# Patient Record
Sex: FEMALE | Race: WHITE | Employment: UNEMPLOYED | URBAN - NONMETROPOLITAN AREA
[De-identification: names, ages, dates, MRNs, and addresses within clinical notes are randomized per-mention and may not be internally consistent; named-entity substitution may affect disease eponyms.]

---

## 2017-01-03 ENCOUNTER — OFFICE VISIT (OUTPATIENT)
Dept: FAMILY MEDICINE CLINIC | Age: 42
End: 2017-01-03

## 2017-01-03 VITALS
DIASTOLIC BLOOD PRESSURE: 74 MMHG | HEIGHT: 60 IN | HEART RATE: 92 BPM | TEMPERATURE: 99.1 F | BODY MASS INDEX: 34.59 KG/M2 | OXYGEN SATURATION: 98 % | WEIGHT: 176.2 LBS | SYSTOLIC BLOOD PRESSURE: 114 MMHG

## 2017-01-03 DIAGNOSIS — J02.9 SORE THROAT: Primary | ICD-10-CM

## 2017-01-03 LAB
INFLUENZA A ANTIBODY: NORMAL
INFLUENZA B ANTIBODY: NORMAL
S PYO AG THROAT QL: NORMAL

## 2017-01-03 PROCEDURE — 87880 STREP A ASSAY W/OPTIC: CPT | Performed by: PHYSICIAN ASSISTANT

## 2017-01-03 PROCEDURE — 99213 OFFICE O/P EST LOW 20 MIN: CPT | Performed by: PHYSICIAN ASSISTANT

## 2017-01-03 PROCEDURE — 87804 INFLUENZA ASSAY W/OPTIC: CPT | Performed by: PHYSICIAN ASSISTANT

## 2017-01-03 RX ORDER — AMOXICILLIN 500 MG/1
500 CAPSULE ORAL 3 TIMES DAILY
Qty: 30 CAPSULE | Refills: 0 | Status: SHIPPED | OUTPATIENT
Start: 2017-01-03 | End: 2017-01-03 | Stop reason: SDUPTHER

## 2017-01-03 RX ORDER — AMOXICILLIN 500 MG/1
500 CAPSULE ORAL 3 TIMES DAILY
Qty: 30 CAPSULE | Refills: 0 | Status: SHIPPED | OUTPATIENT
Start: 2017-01-03 | End: 2017-01-13

## 2017-01-03 ASSESSMENT — ENCOUNTER SYMPTOMS
SORE THROAT: 1
COUGH: 1
SWOLLEN GLANDS: 1

## 2017-06-05 ENCOUNTER — TELEPHONE (OUTPATIENT)
Dept: FAMILY MEDICINE CLINIC | Age: 42
End: 2017-06-05

## 2017-06-07 ENCOUNTER — OFFICE VISIT (OUTPATIENT)
Dept: FAMILY MEDICINE CLINIC | Age: 42
End: 2017-06-07
Payer: COMMERCIAL

## 2017-06-07 VITALS
BODY MASS INDEX: 34.55 KG/M2 | DIASTOLIC BLOOD PRESSURE: 70 MMHG | SYSTOLIC BLOOD PRESSURE: 102 MMHG | HEART RATE: 72 BPM | WEIGHT: 176 LBS | HEIGHT: 60 IN

## 2017-06-07 DIAGNOSIS — E11.9 TYPE 2 DIABETES MELLITUS WITHOUT COMPLICATION, WITHOUT LONG-TERM CURRENT USE OF INSULIN (HCC): Primary | ICD-10-CM

## 2017-06-07 DIAGNOSIS — I10 ESSENTIAL HYPERTENSION: ICD-10-CM

## 2017-06-07 DIAGNOSIS — E78.5 HYPERLIPIDEMIA, UNSPECIFIED HYPERLIPIDEMIA TYPE: ICD-10-CM

## 2017-06-07 PROCEDURE — 3045F PR MOST RECENT HEMOGLOBIN A1C LEVEL 7.0-9.0%: CPT | Performed by: PHYSICIAN ASSISTANT

## 2017-06-07 PROCEDURE — 99214 OFFICE O/P EST MOD 30 MIN: CPT | Performed by: PHYSICIAN ASSISTANT

## 2017-06-07 PROCEDURE — G8417 CALC BMI ABV UP PARAM F/U: HCPCS | Performed by: PHYSICIAN ASSISTANT

## 2017-06-07 PROCEDURE — G8427 DOCREV CUR MEDS BY ELIG CLIN: HCPCS | Performed by: PHYSICIAN ASSISTANT

## 2017-06-07 PROCEDURE — 1036F TOBACCO NON-USER: CPT | Performed by: PHYSICIAN ASSISTANT

## 2017-06-07 RX ORDER — ROSUVASTATIN CALCIUM 5 MG/1
5 TABLET, COATED ORAL DAILY
Qty: 30 TABLET | Refills: 6 | Status: SHIPPED | OUTPATIENT
Start: 2017-06-07 | End: 2017-12-14 | Stop reason: SDUPTHER

## 2017-06-07 RX ORDER — LISINOPRIL 5 MG/1
5 TABLET ORAL DAILY
Qty: 30 TABLET | Refills: 6 | Status: SHIPPED | OUTPATIENT
Start: 2017-06-07 | End: 2017-12-14 | Stop reason: SDUPTHER

## 2017-06-07 ASSESSMENT — ENCOUNTER SYMPTOMS: RESPIRATORY NEGATIVE: 1

## 2017-07-28 ENCOUNTER — TELEPHONE (OUTPATIENT)
Dept: GENERAL RADIOLOGY | Age: 42
End: 2017-07-28

## 2017-07-28 DIAGNOSIS — Z12.31 ENCOUNTER FOR SCREENING MAMMOGRAM FOR BREAST CANCER: Primary | ICD-10-CM

## 2017-08-03 ENCOUNTER — OFFICE VISIT (OUTPATIENT)
Dept: OBGYN | Age: 42
End: 2017-08-03
Payer: COMMERCIAL

## 2017-08-03 ENCOUNTER — HOSPITAL ENCOUNTER (OUTPATIENT)
Age: 42
Setting detail: SPECIMEN
Discharge: HOME | End: 2017-08-03
Payer: COMMERCIAL

## 2017-08-03 VITALS
SYSTOLIC BLOOD PRESSURE: 116 MMHG | DIASTOLIC BLOOD PRESSURE: 84 MMHG | WEIGHT: 175.4 LBS | HEIGHT: 60 IN | BODY MASS INDEX: 34.44 KG/M2 | HEART RATE: 68 BPM

## 2017-08-03 DIAGNOSIS — R92.8 FOLLOW-UP EXAMINATION OF ABNORMAL MAMMOGRAM: Primary | ICD-10-CM

## 2017-08-03 DIAGNOSIS — Z12.4 CERVICAL CANCER SCREENING: ICD-10-CM

## 2017-08-03 DIAGNOSIS — Z12.31 ENCOUNTER FOR SCREENING MAMMOGRAM FOR BREAST CANCER: ICD-10-CM

## 2017-08-03 DIAGNOSIS — Z01.419 WELL FEMALE EXAM WITH ROUTINE GYNECOLOGICAL EXAM: Primary | ICD-10-CM

## 2017-08-03 PROCEDURE — 99396 PREV VISIT EST AGE 40-64: CPT | Performed by: NURSE PRACTITIONER

## 2017-08-04 LAB — CYTOLOGY REPORT: NORMAL

## 2017-08-06 DIAGNOSIS — I10 ESSENTIAL HYPERTENSION: ICD-10-CM

## 2017-08-06 DIAGNOSIS — E78.5 HYPERLIPIDEMIA, UNSPECIFIED HYPERLIPIDEMIA TYPE: ICD-10-CM

## 2017-08-07 RX ORDER — ROSUVASTATIN CALCIUM 5 MG/1
TABLET, COATED ORAL
Qty: 30 TABLET | Refills: 0 | OUTPATIENT
Start: 2017-08-07

## 2017-08-07 RX ORDER — LISINOPRIL 5 MG/1
TABLET ORAL
Qty: 30 TABLET | Refills: 0 | OUTPATIENT
Start: 2017-08-07

## 2017-08-10 DIAGNOSIS — R92.8 FOLLOW-UP EXAMINATION OF ABNORMAL MAMMOGRAM: Primary | ICD-10-CM

## 2017-12-09 ENCOUNTER — HOSPITAL ENCOUNTER (OUTPATIENT)
Dept: LAB | Age: 42
Setting detail: SPECIMEN
Discharge: HOME | End: 2017-12-09
Payer: COMMERCIAL

## 2017-12-09 DIAGNOSIS — E11.9 TYPE 2 DIABETES MELLITUS WITHOUT COMPLICATION, WITHOUT LONG-TERM CURRENT USE OF INSULIN (HCC): ICD-10-CM

## 2017-12-09 DIAGNOSIS — E78.5 HYPERLIPIDEMIA, UNSPECIFIED HYPERLIPIDEMIA TYPE: ICD-10-CM

## 2017-12-09 LAB
ALBUMIN SERPL-MCNC: 4.3 G/DL (ref 3.5–5.2)
ALBUMIN/GLOBULIN RATIO: 1.5 (ref 1–2.5)
ALP BLD-CCNC: 58 U/L (ref 35–104)
ALT SERPL-CCNC: 48 U/L (ref 5–33)
ANION GAP SERPL CALCULATED.3IONS-SCNC: 13 MMOL/L (ref 9–17)
AST SERPL-CCNC: 43 U/L
BILIRUB SERPL-MCNC: 0.98 MG/DL (ref 0.3–1.2)
BUN BLDV-MCNC: 14 MG/DL (ref 6–20)
BUN/CREAT BLD: 29 (ref 9–20)
CALCIUM SERPL-MCNC: 9 MG/DL (ref 8.6–10.4)
CHLORIDE BLD-SCNC: 101 MMOL/L (ref 98–107)
CHOLESTEROL/HDL RATIO: 2.4
CHOLESTEROL: 140 MG/DL
CO2: 25 MMOL/L (ref 20–31)
CREAT SERPL-MCNC: 0.49 MG/DL (ref 0.5–0.9)
ESTIMATED AVERAGE GLUCOSE: 171 MG/DL
GFR AFRICAN AMERICAN: >60 ML/MIN
GFR NON-AFRICAN AMERICAN: >60 ML/MIN
GFR SERPL CREATININE-BSD FRML MDRD: ABNORMAL ML/MIN/{1.73_M2}
GFR SERPL CREATININE-BSD FRML MDRD: ABNORMAL ML/MIN/{1.73_M2}
GLUCOSE BLD-MCNC: 151 MG/DL (ref 70–99)
HBA1C MFR BLD: 7.6 % (ref 4.8–5.9)
HDLC SERPL-MCNC: 58 MG/DL
LDL CHOLESTEROL: 62 MG/DL (ref 0–130)
POTASSIUM SERPL-SCNC: 4.1 MMOL/L (ref 3.7–5.3)
SODIUM BLD-SCNC: 139 MMOL/L (ref 135–144)
TOTAL PROTEIN: 7.1 G/DL (ref 6.4–8.3)
TRIGL SERPL-MCNC: 102 MG/DL
VLDLC SERPL CALC-MCNC: NORMAL MG/DL (ref 1–30)

## 2017-12-09 PROCEDURE — 83036 HEMOGLOBIN GLYCOSYLATED A1C: CPT

## 2017-12-09 PROCEDURE — 80061 LIPID PANEL: CPT

## 2017-12-09 PROCEDURE — 80053 COMPREHEN METABOLIC PANEL: CPT

## 2017-12-09 PROCEDURE — 36415 COLL VENOUS BLD VENIPUNCTURE: CPT

## 2017-12-14 ENCOUNTER — OFFICE VISIT (OUTPATIENT)
Dept: FAMILY MEDICINE CLINIC | Age: 42
End: 2017-12-14
Payer: COMMERCIAL

## 2017-12-14 VITALS
HEART RATE: 88 BPM | WEIGHT: 179 LBS | DIASTOLIC BLOOD PRESSURE: 70 MMHG | HEIGHT: 60 IN | BODY MASS INDEX: 35.14 KG/M2 | SYSTOLIC BLOOD PRESSURE: 100 MMHG

## 2017-12-14 DIAGNOSIS — E78.5 HYPERLIPIDEMIA, UNSPECIFIED HYPERLIPIDEMIA TYPE: ICD-10-CM

## 2017-12-14 DIAGNOSIS — I10 ESSENTIAL HYPERTENSION: ICD-10-CM

## 2017-12-14 DIAGNOSIS — E11.9 TYPE 2 DIABETES MELLITUS WITHOUT COMPLICATION, WITHOUT LONG-TERM CURRENT USE OF INSULIN (HCC): Primary | ICD-10-CM

## 2017-12-14 PROCEDURE — G8427 DOCREV CUR MEDS BY ELIG CLIN: HCPCS | Performed by: PHYSICIAN ASSISTANT

## 2017-12-14 PROCEDURE — G8417 CALC BMI ABV UP PARAM F/U: HCPCS | Performed by: PHYSICIAN ASSISTANT

## 2017-12-14 PROCEDURE — G8484 FLU IMMUNIZE NO ADMIN: HCPCS | Performed by: PHYSICIAN ASSISTANT

## 2017-12-14 PROCEDURE — 3045F PR MOST RECENT HEMOGLOBIN A1C LEVEL 7.0-9.0%: CPT | Performed by: PHYSICIAN ASSISTANT

## 2017-12-14 PROCEDURE — 1036F TOBACCO NON-USER: CPT | Performed by: PHYSICIAN ASSISTANT

## 2017-12-14 PROCEDURE — 99214 OFFICE O/P EST MOD 30 MIN: CPT | Performed by: PHYSICIAN ASSISTANT

## 2017-12-14 RX ORDER — IBUPROFEN 800 MG/1
800 TABLET ORAL EVERY 6 HOURS PRN
Qty: 100 TABLET | Refills: 3 | Status: SHIPPED | OUTPATIENT
Start: 2017-12-14 | End: 2019-10-08

## 2017-12-14 RX ORDER — LISINOPRIL 5 MG/1
5 TABLET ORAL DAILY
Qty: 30 TABLET | Refills: 6 | Status: SHIPPED | OUTPATIENT
Start: 2017-12-14 | End: 2018-08-15

## 2017-12-14 RX ORDER — ROSUVASTATIN CALCIUM 5 MG/1
5 TABLET, COATED ORAL DAILY
Qty: 30 TABLET | Refills: 6 | Status: SHIPPED | OUTPATIENT
Start: 2017-12-14 | End: 2018-08-15

## 2017-12-14 ASSESSMENT — PATIENT HEALTH QUESTIONNAIRE - PHQ9
2. FEELING DOWN, DEPRESSED OR HOPELESS: 0
SUM OF ALL RESPONSES TO PHQ9 QUESTIONS 1 & 2: 0
SUM OF ALL RESPONSES TO PHQ QUESTIONS 1-9: 0
1. LITTLE INTEREST OR PLEASURE IN DOING THINGS: 0

## 2017-12-14 ASSESSMENT — ENCOUNTER SYMPTOMS: RESPIRATORY NEGATIVE: 1

## 2017-12-14 NOTE — PROGRESS NOTES
Chronic Disease Visit Information    BP Readings from Last 3 Encounters:   08/03/17 116/84   06/07/17 102/70   01/03/17 114/74          Hemoglobin A1C (%)   Date Value   12/09/2017 7.6 (H)   06/03/2017 7.2 (H)   10/01/2016 7.0 (H)     Microalb/Crt. Ratio (mcg/mg creat)   Date Value   10/01/2016 5     LDL Cholesterol (mg/dL)   Date Value   12/09/2017 62     HDL (mg/dL)   Date Value   12/09/2017 58     BUN (mg/dL)   Date Value   12/09/2017 14     CREATININE (mg/dL)   Date Value   12/09/2017 0.49 (L)     Glucose (mg/dL)   Date Value   12/09/2017 151 (H)            Have you changed or started any medications since your last visit including any over-the-counter medicines, vitamins, or herbal medicines? no   Are you having any side effects from any of your medications? -  no  Have you stopped taking any of your medications? Is so, why? -  no    Have you seen any other physician or provider since your last visit? No  Have you had any other diagnostic tests since your last visit? Yes - Records Obtained  Have you been seen in the emergency room and/or had an admission to a hospital since we last saw you? No  Have you had your annual diabetic retinal (eye) exam? Yes - Records Obtained  Have you had your routine dental cleaning in the past 6 months? no    Have you activated your [facility] account? If not, what are your barriers?  Yes      Patient Care Team:  Jeannette Beckman as PCP - General (Family Medicine)         Medical History Review  Past Medical, Family, and Social History reviewed and does contribute to the patient presenting condition    Health Maintenance   Topic Date Due    Diabetic retinal exam  08/31/1983    Diabetic microalbuminuria test  10/01/2017    HIV screen  01/14/2018 (Originally 8/31/1988)    Pneumococcal med risk (1 of 1 - PPSV23) 06/07/2018 (Originally 8/31/1992)    DTaP/Tdap/Td vaccine (1 - Tdap) 06/14/2018 (Originally 8/31/1992)    Flu vaccine (1) 06/14/2018 (Originally 9/1/2017)    Diabetic

## 2017-12-14 NOTE — PROGRESS NOTES
Social History   Substance Use Topics    Smoking status: Never Smoker    Smokeless tobacco: Never Used    Alcohol use No       Objective:   Physical Exam   Constitutional: She is oriented to person, place, and time. She appears well-developed. HENT:   Head: Normocephalic. Right Ear: External ear normal.   Left Ear: External ear normal.   Eyes: Pupils are equal, round, and reactive to light. Neck: Neck supple. Cardiovascular: Normal rate and regular rhythm. Pulmonary/Chest: Effort normal and breath sounds normal.   Abdominal: Soft. Bowel sounds are normal.   Neurological: She is alert and oriented to person, place, and time. Skin: Skin is warm and dry. Psychiatric: She has a normal mood and affect.        Hospital Outpatient Visit on 12/09/2017   Component Date Value Ref Range Status    Glucose 12/09/2017 151* 70 - 99 mg/dL Final    BUN 12/09/2017 14  6 - 20 mg/dL Final    CREATININE 12/09/2017 0.49* 0.50 - 0.90 mg/dL Final    Bun/Cre Ratio 12/09/2017 29* 9 - 20 Final    Calcium 12/09/2017 9.0  8.6 - 10.4 mg/dL Final    Sodium 12/09/2017 139  135 - 144 mmol/L Final    Potassium 12/09/2017 4.1  3.7 - 5.3 mmol/L Final    Chloride 12/09/2017 101  98 - 107 mmol/L Final    CO2 12/09/2017 25  20 - 31 mmol/L Final    Anion Gap 12/09/2017 13  9 - 17 mmol/L Final    Alkaline Phosphatase 12/09/2017 58  35 - 104 U/L Final    ALT 12/09/2017 48* 5 - 33 U/L Final    AST 12/09/2017 43* <32 U/L Final    Total Bilirubin 12/09/2017 0.98  0.3 - 1.2 mg/dL Final    Total Protein 12/09/2017 7.1  6.4 - 8.3 g/dL Final    Alb 12/09/2017 4.3  3.5 - 5.2 g/dL Final    Albumin/Globulin Ratio 12/09/2017 1.5  1.0 - 2.5 Final    GFR Non- 12/09/2017 >60  >60 mL/min Final    GFR  12/09/2017 >60  >60 mL/min Final    GFR Comment 12/09/2017        Final    Comment: Average GFR for 38-51 years old:   80 mL/min/1.73sq m  Chronic Kidney Disease:   <60 mL/min/1.73sq m  Kidney failure:   <15 mL/min/1.73sq m              eGFR calculated using average adult body mass. Additional eGFR calculator   available at:        [email or web address]        Performed at [facility] [address] [facility] [address] Gladys Ruiz [phone]      GFR Staging 12/09/2017 NOT REPORTED   Final    Cholesterol 12/09/2017 140  <200 mg/dL Final    Comment:    Cholesterol Guidelines:      <200  Desirable   200-240  Borderline      >240  Undesirable         HDL 12/09/2017 58  >40 mg/dL Final    Comment:    HDL Guidelines:    <40     Undesirable   40-59    Borderline    >59     Desirable         LDL Cholesterol 12/09/2017 62  0 - 130 mg/dL Final    Comment:    LDL Guidelines:     <100    Desirable   100-129   Near to/above Desirable   130-159   Borderline      >159   Undesirable     Direct (measured) LDL and calculated LDL are not interchangeable tests.  Chol/HDL Ratio 12/09/2017 2.4  <5 Final    Triglycerides 12/09/2017 102  <150 mg/dL Final    Comment:    Triglyceride Guidelines:     <150   Desirable   150-199  Borderline   200-499  High     >499   Very high   Based on AHA Guidelines for fasting triglyceride, October 2012. Performed at [facility] [address] [address] [phone]      VLDL 12/09/2017 NOT REPORTED  1 - 30 mg/dL Final    Hemoglobin A1C 12/09/2017 7.6* 4.8 - 5.9 % Final    Estimated Avg Glucose 12/09/2017 171  mg/dL Final    Comment: Performed at [facility] [address] [address] [phone]         Assessment:      1. Type 2 diabetes mellitus without complication, without long-term current use of insulin ([address]    2. Hyperlipidemia, unspecified hyperlipidemia type    3. Essential hypertension          Plan:      Laboratory studies reviewed with patient. Sugars elevated at present.   Patient has started exercise regimen recently. Patient encouraged to continue exercise with goal of 150 minutes weekly. Continue current medications which were refilled for patient. Laboratory studies in six months. Follow-up in six months/sooner PRN.

## 2018-01-04 ENCOUNTER — OFFICE VISIT (OUTPATIENT)
Dept: PRIMARY CARE CLINIC | Age: 43
End: 2018-01-04
Payer: COMMERCIAL

## 2018-01-04 VITALS
TEMPERATURE: 102.5 F | SYSTOLIC BLOOD PRESSURE: 110 MMHG | HEART RATE: 104 BPM | OXYGEN SATURATION: 96 % | BODY MASS INDEX: 35.34 KG/M2 | DIASTOLIC BLOOD PRESSURE: 72 MMHG | WEIGHT: 180 LBS | HEIGHT: 60 IN

## 2018-01-04 DIAGNOSIS — R09.89 CHEST CONGESTION: Primary | ICD-10-CM

## 2018-01-04 DIAGNOSIS — J10.1 INFLUENZA A: ICD-10-CM

## 2018-01-04 LAB
INFLUENZA A ANTIBODY: POSITIVE
INFLUENZA B ANTIBODY: NEGATIVE

## 2018-01-04 PROCEDURE — G8427 DOCREV CUR MEDS BY ELIG CLIN: HCPCS | Performed by: FAMILY MEDICINE

## 2018-01-04 PROCEDURE — 87804 INFLUENZA ASSAY W/OPTIC: CPT | Performed by: FAMILY MEDICINE

## 2018-01-04 PROCEDURE — 99213 OFFICE O/P EST LOW 20 MIN: CPT | Performed by: FAMILY MEDICINE

## 2018-01-04 PROCEDURE — G8417 CALC BMI ABV UP PARAM F/U: HCPCS | Performed by: FAMILY MEDICINE

## 2018-01-04 PROCEDURE — G8484 FLU IMMUNIZE NO ADMIN: HCPCS | Performed by: FAMILY MEDICINE

## 2018-01-04 PROCEDURE — 1036F TOBACCO NON-USER: CPT | Performed by: FAMILY MEDICINE

## 2018-01-04 RX ORDER — OSELTAMIVIR PHOSPHATE 75 MG/1
75 CAPSULE ORAL 2 TIMES DAILY
Qty: 10 CAPSULE | Refills: 0 | Status: SHIPPED | OUTPATIENT
Start: 2018-01-04 | End: 2018-01-09

## 2018-01-04 ASSESSMENT — ENCOUNTER SYMPTOMS
SORE THROAT: 1
EYES NEGATIVE: 1
GASTROINTESTINAL NEGATIVE: 1
COUGH: 1
ALLERGIC/IMMUNOLOGIC NEGATIVE: 1
SINUS PRESSURE: 1

## 2018-01-04 NOTE — PROGRESS NOTES
Subjective:      Patient ID: Anil Del Toro is a 38 y.o. female. HPI acute urgent care visit for cough starting Tuesday. Progressing sore throat, cough, rhinorrhea, congestion, fever and body aches . Using ibuprofen at present. Co workers at [employer] with strep. And uri symptoms recently. Past Medical History:   Diagnosis Date    Diabetes mellitus ([address]     Family history of early CAD     mother had first MI in late 39's    History of gestational diabetes     Hyperlipidemia     Hypertension     Irregular menses     with probable polycystic ovarian syndrome/prediabetes    LFT elevation     likely fatty liver infiltration     Past Surgical History:   Procedure Laterality Date    TONSILLECTOMY AND ADENOIDECTOMY      age 4 or 13   [employer] WISDOM TOOTH EXTRACTION       Current Outpatient Prescriptions   Medication Sig Dispense Refill    rosuvastatin (CRESTOR) 5 MG tablet Take 1 tablet by mouth daily 30 tablet 6    lisinopril (PRINIVIL;ZESTRIL) 5 MG tablet Take 1 tablet by mouth daily 30 tablet 6    ibuprofen (ADVIL;MOTRIN) 800 MG tablet Take 1 tablet by mouth every 6 hours as needed for Pain 100 tablet 3    aspirin 81 MG tablet Take 81 mg by mouth daily. No current facility-administered medications for this visit. No Known Allergies      Review of Systems   Constitutional: Positive for fatigue and fever. HENT: Positive for congestion, postnasal drip, sinus pressure, sneezing and sore throat. Eyes: Negative. Respiratory: Positive for cough. Cardiovascular: Negative. Gastrointestinal: Negative. Endocrine: Negative. Genitourinary: Negative. Musculoskeletal: Positive for myalgias (generalized). Skin: Negative. Allergic/Immunologic: Negative. Neurological: Positive for headaches. Hematological: Negative. Psychiatric/Behavioral: Negative. Objective:   Physical Exam   Constitutional: She is oriented to person, place, and time.  She appears well-developed and well-nourished. No distress. HENT:   Head: Normocephalic and atraumatic. Right Ear: External ear normal.   Left Ear: External ear normal.   Mouth/Throat: Oropharynx is clear and moist. No oropharyngeal exudate. Eyes: Conjunctivae and EOM are normal. No scleral icterus. Neck: Neck supple. No thyromegaly present. Cardiovascular: Normal rate, regular rhythm, normal heart sounds and intact distal pulses. No murmur heard. Pulmonary/Chest: Effort normal and breath sounds normal. No respiratory distress. She has no wheezes. Abdominal: Soft. Bowel sounds are normal. She exhibits no distension. There is no tenderness. There is no rebound. Musculoskeletal: Normal range of motion. She exhibits no edema or tenderness. Neurological: She is alert and oriented to person, place, and time. Skin: Skin is warm and dry. No rash noted. No erythema. Psychiatric: She has a normal mood and affect. Her behavior is normal. Judgment and thought content normal.     /72 (Site: Left Arm, Position: Sitting, Cuff Size: Small Adult)   Pulse 104   Temp 102.5 °F (39.2 °C) (Tympanic)   Ht 5' (1.524 m)   Wt 180 lb (81.6 kg)   SpO2 96%   BMI 35.15 kg/m²     Assessment:      Influenza A      Plan:      tamiflu 75mg bid x 5 days  Discussed typical course, supportive care, and complications  Recheck prn concerns.

## 2018-01-06 ENCOUNTER — OFFICE VISIT (OUTPATIENT)
Dept: PRIMARY CARE CLINIC | Age: 43
End: 2018-01-06
Payer: COMMERCIAL

## 2018-01-06 VITALS
DIASTOLIC BLOOD PRESSURE: 62 MMHG | HEART RATE: 102 BPM | TEMPERATURE: 99.8 F | OXYGEN SATURATION: 96 % | SYSTOLIC BLOOD PRESSURE: 90 MMHG | RESPIRATION RATE: 16 BRPM | HEIGHT: 60 IN | WEIGHT: 182 LBS | BODY MASS INDEX: 35.73 KG/M2

## 2018-01-06 DIAGNOSIS — J10.1 INFLUENZA A: Primary | ICD-10-CM

## 2018-01-06 PROCEDURE — G8417 CALC BMI ABV UP PARAM F/U: HCPCS | Performed by: FAMILY MEDICINE

## 2018-01-06 PROCEDURE — 1036F TOBACCO NON-USER: CPT | Performed by: FAMILY MEDICINE

## 2018-01-06 PROCEDURE — G8484 FLU IMMUNIZE NO ADMIN: HCPCS | Performed by: FAMILY MEDICINE

## 2018-01-06 PROCEDURE — G8427 DOCREV CUR MEDS BY ELIG CLIN: HCPCS | Performed by: FAMILY MEDICINE

## 2018-01-06 PROCEDURE — 99213 OFFICE O/P EST LOW 20 MIN: CPT | Performed by: FAMILY MEDICINE

## 2018-01-06 ASSESSMENT — ENCOUNTER SYMPTOMS
EYES NEGATIVE: 1
SORE THROAT: 1
GASTROINTESTINAL NEGATIVE: 1
COUGH: 1
SINUS PRESSURE: 1
ALLERGIC/IMMUNOLOGIC NEGATIVE: 1

## 2018-01-06 NOTE — PROGRESS NOTES
Subjective:      Patient ID: Tessa Dubin is a 38 y.o. female. HPI acute urgent care visit for ongoing fever and headaches from influenza. Seen Thursday with diagnosis of influenza A. Started on tamiflu at that time. Fever continues along with fatigue and headache. Ibuprofen helps some, but fever recurrent before the next dose. Excessive fatigue and tiredness, body aches. Eating and drinking ok, appetite reduced. Mild cough, not problematic, improved from earlier this week. Past Medical History:   Diagnosis Date    Diabetes mellitus ([address]     Family history of early CAD     mother had first MI in late 39's    History of gestational diabetes     Hyperlipidemia     Hypertension     Irregular menses     with probable polycystic ovarian syndrome/prediabetes    LFT elevation     likely fatty liver infiltration     Past Surgical History:   Procedure Laterality Date    TONSILLECTOMY AND ADENOIDECTOMY      age 4 or 13   [facility] WISDOM TOOTH EXTRACTION       Current Outpatient Prescriptions   Medication Sig Dispense Refill    oseltamivir (TAMIFLU) 75 MG capsule Take 1 capsule by mouth 2 times daily for 5 days 10 capsule 0    rosuvastatin (CRESTOR) 5 MG tablet Take 1 tablet by mouth daily 30 tablet 6    lisinopril (PRINIVIL;ZESTRIL) 5 MG tablet Take 1 tablet by mouth daily 30 tablet 6    ibuprofen (ADVIL;MOTRIN) 800 MG tablet Take 1 tablet by mouth every 6 hours as needed for Pain 100 tablet 3    aspirin 81 MG tablet Take 81 mg by mouth daily. No current facility-administered medications for this visit. No Known Allergies      Review of Systems   Constitutional: Positive for fatigue and fever. HENT: Positive for congestion, postnasal drip, sinus pressure and sore throat. Negative for sneezing. Eyes: Negative. Respiratory: Positive for cough. Cardiovascular: Negative. Gastrointestinal: Negative. Endocrine: Negative. Genitourinary: Negative.     Musculoskeletal: Positive

## 2018-03-13 ENCOUNTER — OFFICE VISIT (OUTPATIENT)
Dept: FAMILY MEDICINE CLINIC | Age: 43
End: 2018-03-13
Payer: COMMERCIAL

## 2018-03-13 VITALS
HEIGHT: 60 IN | WEIGHT: 177.8 LBS | DIASTOLIC BLOOD PRESSURE: 70 MMHG | BODY MASS INDEX: 34.91 KG/M2 | HEART RATE: 106 BPM | OXYGEN SATURATION: 96 % | SYSTOLIC BLOOD PRESSURE: 110 MMHG | TEMPERATURE: 100.2 F

## 2018-03-13 DIAGNOSIS — J11.1 INFLUENZA-LIKE ILLNESS: Primary | ICD-10-CM

## 2018-03-13 DIAGNOSIS — R52 BODY ACHES: ICD-10-CM

## 2018-03-13 DIAGNOSIS — R50.9 FEVER, UNSPECIFIED FEVER CAUSE: ICD-10-CM

## 2018-03-13 LAB
INFLUENZA A ANTIBODY: NEGATIVE
INFLUENZA B ANTIBODY: POSITIVE

## 2018-03-13 PROCEDURE — 87804 INFLUENZA ASSAY W/OPTIC: CPT | Performed by: NURSE PRACTITIONER

## 2018-03-13 PROCEDURE — G8484 FLU IMMUNIZE NO ADMIN: HCPCS | Performed by: NURSE PRACTITIONER

## 2018-03-13 PROCEDURE — 99213 OFFICE O/P EST LOW 20 MIN: CPT | Performed by: NURSE PRACTITIONER

## 2018-03-13 PROCEDURE — 1036F TOBACCO NON-USER: CPT | Performed by: NURSE PRACTITIONER

## 2018-03-13 PROCEDURE — G8427 DOCREV CUR MEDS BY ELIG CLIN: HCPCS | Performed by: NURSE PRACTITIONER

## 2018-03-13 PROCEDURE — G8417 CALC BMI ABV UP PARAM F/U: HCPCS | Performed by: NURSE PRACTITIONER

## 2018-03-13 RX ORDER — BENZONATATE 100 MG/1
100 CAPSULE ORAL 3 TIMES DAILY PRN
Qty: 30 CAPSULE | Refills: 1 | Status: SHIPPED | OUTPATIENT
Start: 2018-03-13 | End: 2018-08-15 | Stop reason: ALTCHOICE

## 2018-03-13 RX ORDER — OSELTAMIVIR PHOSPHATE 75 MG/1
75 CAPSULE ORAL 2 TIMES DAILY
Qty: 10 CAPSULE | Refills: 0 | Status: SHIPPED | OUTPATIENT
Start: 2018-03-13 | End: 2018-03-18

## 2018-03-13 RX ORDER — PREDNISONE 20 MG/1
20 TABLET ORAL 2 TIMES DAILY
Qty: 10 TABLET | Refills: 0 | Status: SHIPPED | OUTPATIENT
Start: 2018-03-13 | End: 2018-03-18

## 2018-03-13 RX ORDER — AZITHROMYCIN 250 MG/1
TABLET, FILM COATED ORAL
Qty: 1 PACKET | Refills: 0 | Status: SHIPPED | OUTPATIENT
Start: 2018-03-13 | End: 2018-03-18

## 2018-03-13 ASSESSMENT — ENCOUNTER SYMPTOMS
WHEEZING: 1
SHORTNESS OF BREATH: 0
COUGH: 1
RHINORRHEA: 1
SORE THROAT: 1

## 2018-03-13 NOTE — LETTER
[facility] [address] [place] [address]  Phone: [phone]  Fax: [phone]    Jessy Moya NP        March 13, 2018     Patient: Mary Casas   YOB: 1975   Date of Visit: 3/13/2018       To Whom it May Concern:    Mary Casas was seen in my clinic on 3/13/2018. She may return to work on 3/19/2018. If you have any questions or concerns, please don't hesitate to call.     Sincerely,         Jessy Moya NP

## 2018-06-22 ENCOUNTER — TELEPHONE (OUTPATIENT)
Dept: PRIMARY CARE CLINIC | Age: 43
End: 2018-06-22

## 2018-07-02 ENCOUNTER — TELEPHONE (OUTPATIENT)
Dept: MAMMOGRAPHY | Age: 43
End: 2018-07-02

## 2018-07-02 DIAGNOSIS — R92.8 ABNORMAL MAMMOGRAM: Primary | ICD-10-CM

## 2018-08-09 ENCOUNTER — TELEPHONE (OUTPATIENT)
Dept: PRIMARY CARE CLINIC | Age: 43
End: 2018-08-09

## 2018-08-15 ENCOUNTER — HOSPITAL ENCOUNTER (OUTPATIENT)
Dept: ULTRASOUND IMAGING | Age: 43
Discharge: HOME | End: 2018-08-17
Payer: COMMERCIAL

## 2018-08-15 ENCOUNTER — OFFICE VISIT (OUTPATIENT)
Dept: OBGYN | Age: 43
End: 2018-08-15
Payer: COMMERCIAL

## 2018-08-15 ENCOUNTER — HOSPITAL ENCOUNTER (OUTPATIENT)
Age: 43
Setting detail: SPECIMEN
Discharge: HOME | End: 2018-08-15
Payer: COMMERCIAL

## 2018-08-15 ENCOUNTER — HOSPITAL ENCOUNTER (OUTPATIENT)
Dept: MAMMOGRAPHY | Age: 43
Discharge: HOME | End: 2018-08-17
Payer: COMMERCIAL

## 2018-08-15 VITALS
HEIGHT: 60 IN | DIASTOLIC BLOOD PRESSURE: 74 MMHG | WEIGHT: 181 LBS | RESPIRATION RATE: 16 BRPM | HEART RATE: 80 BPM | BODY MASS INDEX: 35.53 KG/M2 | SYSTOLIC BLOOD PRESSURE: 122 MMHG

## 2018-08-15 DIAGNOSIS — R92.8 ABNORMAL MAMMOGRAM OF LEFT BREAST: ICD-10-CM

## 2018-08-15 DIAGNOSIS — Z01.419 WELL FEMALE EXAM WITH ROUTINE GYNECOLOGICAL EXAM: ICD-10-CM

## 2018-08-15 DIAGNOSIS — Z01.419 WELL FEMALE EXAM WITH ROUTINE GYNECOLOGICAL EXAM: Primary | ICD-10-CM

## 2018-08-15 DIAGNOSIS — R92.8 ABNORMAL MAMMOGRAM: ICD-10-CM

## 2018-08-15 DIAGNOSIS — Z12.4 SCREENING FOR CERVICAL CANCER: ICD-10-CM

## 2018-08-15 DIAGNOSIS — Z12.31 VISIT FOR SCREENING MAMMOGRAM: ICD-10-CM

## 2018-08-15 PROCEDURE — 77066 DX MAMMO INCL CAD BI: CPT

## 2018-08-15 PROCEDURE — 87624 HPV HI-RISK TYP POOLED RSLT: CPT

## 2018-08-15 PROCEDURE — G0145 SCR C/V CYTO,THINLAYER,RESCR: HCPCS

## 2018-08-15 PROCEDURE — 76642 ULTRASOUND BREAST LIMITED: CPT

## 2018-08-15 PROCEDURE — 99396 PREV VISIT EST AGE 40-64: CPT | Performed by: NURSE PRACTITIONER

## 2018-08-15 NOTE — PROGRESS NOTES
Sandra Halepanda  8/15/2018              38 y.o. Chief Complaint   Patient presents with    Gynecologic Exam     menses are lighter for past 3-4  months         Patient's last menstrual period was 2018 (approximate). No referring provider defined for this encounter. HPI :Annual Exam  Patient presents for annual exam.  Counseling on healthy lifestyle reviewed, as well as the need for self breast exam. We reviewed the need for [facility] exercise. We discussed and reviewed the need for routine screenings and immunization updates when appropriate. Stopped lisinopril and crestor since March, stating while on the medication, her menses were much heavier. Stopped meds for a few months prior to that and had noted that menses improved, started the medication again, and menses once again became heavy. Ultimately, stopped both in March and has not resumed. Menses every month lasting 4-5 days and changes tampon 3-4 times per day. (6-8 while on meds). Also using smaller tampons. Performs self breast exams every other month. Follow up diagnostic mammogram later today. Weight gain. Discussed management strategies. The patient is sexually active. last pap: was normal  The patient has regular exercise: yes .  We did review the need for and frequency of both weight bearing and strengthening as tolerated by the patient. ________________________________________________________________________  Obstetric History       T1      L1     SAB0   TAB0   Ectopic0   Molar0   Multiple0   Live Births0       # Outcome Date GA Lbr Arash/2nd Weight Sex Delivery Anes PTL Lv   1 Term               Obstetric Comments   Menarche-10   Cycles-historically irregular/didn't tolerate oral contraceptives     Past Medical History:   Diagnosis Date    Diabetes mellitus ([address]     Family history of early CAD     mother had first MI in late 39's    History of gestational diabetes     Hyperlipidemia     Hypertension     Irregular menses     with probable polycystic ovarian syndrome/prediabetes    LFT elevation     likely fatty liver infiltration                                                                   Past Surgical History:   Procedure Laterality Date    TONSILLECTOMY AND ADENOIDECTOMY      age 4 or 13   [facility] WISDOM TOOTH EXTRACTION       Family History   Problem Relation Age of Onset    Diabetes Mother         type II    Thyroid Disease Mother         hypothyroidism    Heart Disease Mother         has 3 attacks open heart surgery and also has stents    High Cholesterol Father     Other Father         psoriasis    High Cholesterol Sister     Heart Disease Maternal Grandfather     Stroke Paternal Grandfather     Lung Cancer Paternal Grandmother      Social History     Social History    Marital status:      Spouse name: N/A    Number of children: N/A    Years of education: N/A     Occupational History    stay at home mother [employer]     Social History Main Topics    Smoking status: Never Smoker    Smokeless tobacco: Never Used    Alcohol use No    Drug use: No    Sexual activity: Yes     Partners: Male     Birth control/ protection: Condom     Other Topics Concern    Not on file     Social History Narrative    No narrative on file       MEDICATIONS:  Current Outpatient Prescriptions   Medication Sig Dispense Refill    ibuprofen (ADVIL;MOTRIN) 800 MG tablet Take 1 tablet by mouth every 6 hours as needed for Pain 100 tablet 3    aspirin 81 MG tablet Take 81 mg by mouth daily.  rosuvastatin (CRESTOR) 5 MG tablet Take 1 tablet by mouth daily 30 tablet 6    lisinopril (PRINIVIL;ZESTRIL) 5 MG tablet Take 1 tablet by mouth daily 30 tablet 6     No current facility-administered medications for this visit.         ALLERGIES:  Allergies as of 08/15/2018    (No Known Allergies)           Gynecologic History:  Menarche: 13 yo  Patient's last menstrual period extremities were without calf tenderness, edema, or varicosities. There was full range of motion in all four extremities. Pulses in all four extremities    Abdomen: The abdomen was soft and non-tender. There were good bowel sounds in all quadrants and there was no guarding, rebound or rigidity. On evaluation there was no evidence of hepatosplenomegaly and there was no costal vertebral china tenderness bilaterally. No hernias were appreciated. Psych: The patient had a normal Orientation to: Time, Place, Person, and Situation  Mood and affect appropriate    Breast:  (Chest)  normal appearance, no masses or tenderness, Inspection negative, No nipple retraction or dimpling, No nipple discharge or bleeding, No axillary or supraclavicular adenopathy, Normal to palpation without dominant masses, negative findings: normal in size and symmetry, normal contour with no evidence of flattening or dimpling, skin normal, nipples everted without rashes or discharge, palpation negative for masses or nodules, no palpable axillary lymphadenopathy      Pelvic Exam:  External genitalia: normal general appearance  Urinary system: urethral meatus normal  Vaginal: normal mucosa without prolapse or lesions, normal without tenderness, induration or masses and normal rugae  Cervix: normal appearance and thin prep PAP obtained  Adnexa: normal bimanual exam and non palpable  Uterus: normal single, nontender and anteverted    Musculosk:  Normal Gait and station was noted. Digits were evaluated without abnormal findings. Range of motion, stability and strength were evaluated and found to be appropriate for the patients age. ASSESSMENT:      38 y.o. Annual   Diagnosis Orders   1. Well female exam with routine gynecological exam     2. Screening for cervical cancer     3.  Visit for screening mammogram          Chief Complaint   Patient presents with    Gynecologic Exam     menses are lighter for past 3-4  months         Past Medical History:   Diagnosis Date    [facility]     Family history of early CAD     mother had first MI in late 39's    History of gestational diabetes     Hyperlipidemia     Hypertension     Irregular menses     with probable polycystic ovarian syndrome/prediabetes    LFT elevation     likely fatty liver infiltration         Patient Active Problem List   Diagnosis    Hyperlipidemia    Irregular menses    Family history of early CAD    LFT elevation    History of gestational diabetes    Diabetes mellitus ([facility]    Secondary amenorrhea          Hereditary Breast, Ovarian, Colon and Uterine Cancer screening Done. Tobacco & Secondary smoke risks reviewed; instructed on cessation and avoidance    PLAN:  Return in about 1 year (around 8/15/2019) for Annual Exam.  Repeat pap per ASCCP 2013 guidelines  Return for annual exams  Mammograms every 1 year. If 35 yo and last mammogram was negative. Routine health maintenance per patients PCP. No orders of the defined types were placed in this encounter.       Haile Caputo  8/15/2018      (Please note that portions of this note were completed with a voice-recognition program. Efforts were made to edit the dictations but occasionally words are mis-transcribed.)

## 2018-09-07 LAB
CYTOLOGY REPORT: NORMAL
HPV SAMPLE: NORMAL
HPV SOURCE: NORMAL
HPV, GENOTYPE 16: NOT DETECTED
HPV, GENOTYPE 18: NOT DETECTED
HPV, HIGH RISK OTHER: NOT DETECTED
HPV, INTERPRETATION: NORMAL

## 2018-09-17 ENCOUNTER — TELEPHONE (OUTPATIENT)
Dept: PRIMARY CARE CLINIC | Age: 43
End: 2018-09-17

## 2018-09-17 NOTE — LETTER
[address] [facility]  [facility] [address]  Phone: [phone]  Fax: [phone]    [address] [place] [address]        September 17, 2018     Erick [address]  [place] [address]      Dear Salazar Cameron: This letter is a reminder that your fasting labs tests are due. Please call our office to schedule an appointment. If you've already underwent or scheduled these procedures, please disregard this notice.     Sincerely,        [address] [place] [address]

## 2018-10-25 ENCOUNTER — TELEPHONE (OUTPATIENT)
Dept: PRIMARY CARE CLINIC | Age: 43
End: 2018-10-25

## 2019-01-08 ENCOUNTER — OFFICE VISIT (OUTPATIENT)
Dept: FAMILY MEDICINE CLINIC | Age: 44
End: 2019-01-08
Payer: COMMERCIAL

## 2019-01-08 ENCOUNTER — HOSPITAL ENCOUNTER (OUTPATIENT)
Dept: LAB | Age: 44
Discharge: HOME | End: 2019-01-08
Payer: COMMERCIAL

## 2019-01-08 VITALS
HEIGHT: 60 IN | BODY MASS INDEX: 35.73 KG/M2 | DIASTOLIC BLOOD PRESSURE: 78 MMHG | WEIGHT: 182 LBS | HEART RATE: 76 BPM | SYSTOLIC BLOOD PRESSURE: 112 MMHG

## 2019-01-08 DIAGNOSIS — E78.5 HYPERLIPIDEMIA, UNSPECIFIED HYPERLIPIDEMIA TYPE: ICD-10-CM

## 2019-01-08 DIAGNOSIS — E11.9 TYPE 2 DIABETES MELLITUS WITHOUT COMPLICATION, WITHOUT LONG-TERM CURRENT USE OF INSULIN (HCC): ICD-10-CM

## 2019-01-08 DIAGNOSIS — E11.9 TYPE 2 DIABETES MELLITUS WITHOUT COMPLICATION, WITHOUT LONG-TERM CURRENT USE OF INSULIN (HCC): Primary | ICD-10-CM

## 2019-01-08 LAB
ABSOLUTE EOS #: 0.2 K/UL (ref 0–0.4)
ABSOLUTE IMMATURE GRANULOCYTE: ABNORMAL K/UL (ref 0–0.3)
ABSOLUTE LYMPH #: 2.2 K/UL (ref 1–4.8)
ABSOLUTE MONO #: 0.4 K/UL (ref 0.1–1.2)
ALBUMIN SERPL-MCNC: 4.5 G/DL (ref 3.5–5.2)
ALBUMIN/GLOBULIN RATIO: 1.4 (ref 1–2.5)
ALP BLD-CCNC: 76 U/L (ref 35–104)
ALT SERPL-CCNC: 114 U/L (ref 5–33)
ANION GAP SERPL CALCULATED.3IONS-SCNC: 13 MMOL/L (ref 9–17)
AST SERPL-CCNC: 88 U/L
BASOPHILS # BLD: 2 % (ref 0–1)
BASOPHILS ABSOLUTE: 0.1 K/UL (ref 0–0.2)
BILIRUB SERPL-MCNC: 0.81 MG/DL (ref 0.3–1.2)
BUN BLDV-MCNC: 10 MG/DL (ref 6–20)
BUN/CREAT BLD: 19 (ref 9–20)
CALCIUM SERPL-MCNC: 9.6 MG/DL (ref 8.6–10.4)
CHLORIDE BLD-SCNC: 100 MMOL/L (ref 98–107)
CHOLESTEROL/HDL RATIO: 3.7
CHOLESTEROL: 210 MG/DL
CO2: 27 MMOL/L (ref 20–31)
CREAT SERPL-MCNC: 0.53 MG/DL (ref 0.5–0.9)
CREATININE URINE: 270 MG/DL (ref 28–217)
DIFFERENTIAL TYPE: ABNORMAL
EOSINOPHILS RELATIVE PERCENT: 3 % (ref 1–7)
ESTIMATED AVERAGE GLUCOSE: 206 MG/DL
GFR AFRICAN AMERICAN: >60 ML/MIN
GFR NON-AFRICAN AMERICAN: >60 ML/MIN
GFR SERPL CREATININE-BSD FRML MDRD: ABNORMAL ML/MIN/{1.73_M2}
GFR SERPL CREATININE-BSD FRML MDRD: ABNORMAL ML/MIN/{1.73_M2}
GLUCOSE BLD-MCNC: 189 MG/DL (ref 70–99)
HBA1C MFR BLD: 8.8 % (ref 4.8–5.9)
HCT VFR BLD CALC: 45.7 % (ref 36–46)
HDLC SERPL-MCNC: 57 MG/DL
HEMOGLOBIN: 15.1 G/DL (ref 12–16)
IMMATURE GRANULOCYTES: ABNORMAL %
LDL CHOLESTEROL: 130 MG/DL (ref 0–130)
LYMPHOCYTES # BLD: 37 % (ref 16–46)
MCH RBC QN AUTO: 29.8 PG (ref 26–34)
MCHC RBC AUTO-ENTMCNC: 33 G/DL (ref 31–37)
MCV RBC AUTO: 90.4 FL (ref 80–100)
MICROALBUMIN/CREAT 24H UR: 21 MG/L
MICROALBUMIN/CREAT UR-RTO: 8 MCG/MG CREAT
MONOCYTES # BLD: 7 % (ref 4–11)
NRBC AUTOMATED: ABNORMAL PER 100 WBC
PDW BLD-RTO: 13.8 % (ref 11–14.5)
PLATELET # BLD: 224 K/UL (ref 140–450)
PLATELET ESTIMATE: ABNORMAL
PMV BLD AUTO: 8.7 FL (ref 6–12)
POTASSIUM SERPL-SCNC: 4.4 MMOL/L (ref 3.7–5.3)
RBC # BLD: 5.06 M/UL (ref 4–5.2)
RBC # BLD: ABNORMAL 10*6/UL
SEG NEUTROPHILS: 51 % (ref 43–77)
SEGMENTED NEUTROPHILS ABSOLUTE COUNT: 3.1 K/UL (ref 1.8–7.7)
SODIUM BLD-SCNC: 140 MMOL/L (ref 135–144)
THYROXINE, FREE: 1.06 NG/DL (ref 0.93–1.7)
TOTAL PROTEIN: 7.8 G/DL (ref 6.4–8.3)
TRIGL SERPL-MCNC: 115 MG/DL
TSH SERPL DL<=0.05 MIU/L-ACNC: 5.01 MIU/L (ref 0.3–5)
VLDLC SERPL CALC-MCNC: ABNORMAL MG/DL (ref 1–30)
WBC # BLD: 6.1 K/UL (ref 3.5–11)
WBC # BLD: ABNORMAL 10*3/UL

## 2019-01-08 PROCEDURE — G8484 FLU IMMUNIZE NO ADMIN: HCPCS | Performed by: PHYSICIAN ASSISTANT

## 2019-01-08 PROCEDURE — 82043 UR ALBUMIN QUANTITATIVE: CPT

## 2019-01-08 PROCEDURE — 2022F DILAT RTA XM EVC RTNOPTHY: CPT | Performed by: PHYSICIAN ASSISTANT

## 2019-01-08 PROCEDURE — 85025 COMPLETE CBC W/AUTO DIFF WBC: CPT

## 2019-01-08 PROCEDURE — 36415 COLL VENOUS BLD VENIPUNCTURE: CPT

## 2019-01-08 PROCEDURE — 84443 ASSAY THYROID STIM HORMONE: CPT

## 2019-01-08 PROCEDURE — 1036F TOBACCO NON-USER: CPT | Performed by: PHYSICIAN ASSISTANT

## 2019-01-08 PROCEDURE — 99214 OFFICE O/P EST MOD 30 MIN: CPT | Performed by: PHYSICIAN ASSISTANT

## 2019-01-08 PROCEDURE — 3045F PR MOST RECENT HEMOGLOBIN A1C LEVEL 7.0-9.0%: CPT | Performed by: PHYSICIAN ASSISTANT

## 2019-01-08 PROCEDURE — G8427 DOCREV CUR MEDS BY ELIG CLIN: HCPCS | Performed by: PHYSICIAN ASSISTANT

## 2019-01-08 PROCEDURE — 84439 ASSAY OF FREE THYROXINE: CPT

## 2019-01-08 PROCEDURE — G8417 CALC BMI ABV UP PARAM F/U: HCPCS | Performed by: PHYSICIAN ASSISTANT

## 2019-01-08 PROCEDURE — 80061 LIPID PANEL: CPT

## 2019-01-08 PROCEDURE — 83036 HEMOGLOBIN GLYCOSYLATED A1C: CPT

## 2019-01-08 PROCEDURE — 82570 ASSAY OF URINE CREATININE: CPT

## 2019-01-08 PROCEDURE — 80053 COMPREHEN METABOLIC PANEL: CPT

## 2019-01-08 RX ORDER — TRIAMCINOLONE ACETONIDE 1 MG/G
CREAM TOPICAL 3 TIMES DAILY
Qty: 80 G | Refills: 0 | Status: SHIPPED | OUTPATIENT
Start: 2019-01-08 | End: 2019-09-24 | Stop reason: ALTCHOICE

## 2019-01-08 ASSESSMENT — PATIENT HEALTH QUESTIONNAIRE - PHQ9
SUM OF ALL RESPONSES TO PHQ9 QUESTIONS 1 & 2: 0
2. FEELING DOWN, DEPRESSED OR HOPELESS: 0
1. LITTLE INTEREST OR PLEASURE IN DOING THINGS: 0
SUM OF ALL RESPONSES TO PHQ QUESTIONS 1-9: 0
2. FEELING DOWN, DEPRESSED OR HOPELESS: 0
SUM OF ALL RESPONSES TO PHQ QUESTIONS 1-9: 0

## 2019-01-09 ENCOUNTER — TELEPHONE (OUTPATIENT)
Dept: FAMILY MEDICINE CLINIC | Age: 44
End: 2019-01-09

## 2019-01-09 DIAGNOSIS — E78.5 HYPERLIPIDEMIA, UNSPECIFIED HYPERLIPIDEMIA TYPE: ICD-10-CM

## 2019-01-09 DIAGNOSIS — E11.9 TYPE 2 DIABETES MELLITUS WITHOUT COMPLICATION, WITHOUT LONG-TERM CURRENT USE OF INSULIN (HCC): Primary | ICD-10-CM

## 2019-01-09 DIAGNOSIS — E03.9 HYPOTHYROIDISM, UNSPECIFIED TYPE: ICD-10-CM

## 2019-01-09 ASSESSMENT — ENCOUNTER SYMPTOMS: RESPIRATORY NEGATIVE: 1

## 2019-01-10 ENCOUNTER — TELEPHONE (OUTPATIENT)
Dept: FAMILY MEDICINE CLINIC | Age: 44
End: 2019-01-10

## 2019-01-10 RX ORDER — LOSARTAN POTASSIUM 25 MG/1
25 TABLET ORAL DAILY
Qty: 30 TABLET | Refills: 3 | Status: SHIPPED | OUTPATIENT
Start: 2019-01-10

## 2019-01-10 RX ORDER — LEVOTHYROXINE SODIUM 0.03 MG/1
25 TABLET ORAL DAILY
Qty: 30 TABLET | Refills: 3 | Status: SHIPPED | OUTPATIENT
Start: 2019-01-10

## 2019-01-10 RX ORDER — METFORMIN HYDROCHLORIDE 500 MG/1
500 TABLET, EXTENDED RELEASE ORAL 2 TIMES DAILY
Qty: 60 TABLET | Refills: 3 | Status: SHIPPED | OUTPATIENT
Start: 2019-01-10

## 2019-02-18 ENCOUNTER — OFFICE VISIT (OUTPATIENT)
Dept: PRIMARY CARE CLINIC | Age: 44
End: 2019-02-18
Payer: COMMERCIAL

## 2019-02-18 VITALS
HEART RATE: 76 BPM | BODY MASS INDEX: 36.56 KG/M2 | WEIGHT: 186.2 LBS | TEMPERATURE: 99.2 F | HEIGHT: 60 IN | SYSTOLIC BLOOD PRESSURE: 118 MMHG | DIASTOLIC BLOOD PRESSURE: 82 MMHG | RESPIRATION RATE: 16 BRPM | OXYGEN SATURATION: 98 %

## 2019-02-18 DIAGNOSIS — H01.9 INFECTED EYE LID: Primary | ICD-10-CM

## 2019-02-18 PROCEDURE — G8417 CALC BMI ABV UP PARAM F/U: HCPCS | Performed by: NURSE PRACTITIONER

## 2019-02-18 PROCEDURE — G8427 DOCREV CUR MEDS BY ELIG CLIN: HCPCS | Performed by: NURSE PRACTITIONER

## 2019-02-18 PROCEDURE — 1036F TOBACCO NON-USER: CPT | Performed by: NURSE PRACTITIONER

## 2019-02-18 PROCEDURE — 99213 OFFICE O/P EST LOW 20 MIN: CPT | Performed by: NURSE PRACTITIONER

## 2019-02-18 PROCEDURE — G8484 FLU IMMUNIZE NO ADMIN: HCPCS | Performed by: NURSE PRACTITIONER

## 2019-02-18 RX ORDER — POLYMYXIN B SULFATE AND TRIMETHOPRIM 1; 10000 MG/ML; [USP'U]/ML
1 SOLUTION OPHTHALMIC EVERY 4 HOURS
Qty: 1 BOTTLE | Refills: 0 | Status: SHIPPED | OUTPATIENT
Start: 2019-02-18 | End: 2019-02-28

## 2019-02-18 ASSESSMENT — ENCOUNTER SYMPTOMS
RHINORRHEA: 0
SINUS PRESSURE: 0
EYE DISCHARGE: 1
EYE ITCHING: 1
PHOTOPHOBIA: 0
GASTROINTESTINAL NEGATIVE: 1
RESPIRATORY NEGATIVE: 1
COUGH: 0
TROUBLE SWALLOWING: 0
EYE PAIN: 1
EYE REDNESS: 1

## 2019-04-06 ENCOUNTER — HOSPITAL ENCOUNTER (OUTPATIENT)
Dept: LAB | Age: 44
Discharge: HOME | End: 2019-04-06
Payer: COMMERCIAL

## 2019-04-06 DIAGNOSIS — E11.9 TYPE 2 DIABETES MELLITUS WITHOUT COMPLICATION, WITHOUT LONG-TERM CURRENT USE OF INSULIN (HCC): ICD-10-CM

## 2019-04-06 DIAGNOSIS — E78.5 HYPERLIPIDEMIA, UNSPECIFIED HYPERLIPIDEMIA TYPE: ICD-10-CM

## 2019-04-06 DIAGNOSIS — E03.9 HYPOTHYROIDISM, UNSPECIFIED TYPE: ICD-10-CM

## 2019-04-06 LAB
ALBUMIN SERPL-MCNC: 4.1 G/DL (ref 3.5–5.2)
ALBUMIN/GLOBULIN RATIO: 1.3 (ref 1–2.5)
ALP BLD-CCNC: 69 U/L (ref 35–104)
ALT SERPL-CCNC: 45 U/L (ref 5–33)
ANION GAP SERPL CALCULATED.3IONS-SCNC: 11 MMOL/L (ref 9–17)
AST SERPL-CCNC: 36 U/L
BILIRUB SERPL-MCNC: 0.97 MG/DL (ref 0.3–1.2)
BUN BLDV-MCNC: 11 MG/DL (ref 6–20)
BUN/CREAT BLD: 23 (ref 9–20)
CALCIUM SERPL-MCNC: 8.9 MG/DL (ref 8.6–10.4)
CHLORIDE BLD-SCNC: 101 MMOL/L (ref 98–107)
CHOLESTEROL/HDL RATIO: 3.5
CHOLESTEROL: 167 MG/DL
CO2: 24 MMOL/L (ref 20–31)
CREAT SERPL-MCNC: 0.48 MG/DL (ref 0.5–0.9)
CREATININE URINE: 269 MG/DL (ref 28–217)
ESTIMATED AVERAGE GLUCOSE: 194 MG/DL
GFR AFRICAN AMERICAN: >60 ML/MIN
GFR NON-AFRICAN AMERICAN: >60 ML/MIN
GFR SERPL CREATININE-BSD FRML MDRD: ABNORMAL ML/MIN/{1.73_M2}
GFR SERPL CREATININE-BSD FRML MDRD: ABNORMAL ML/MIN/{1.73_M2}
GLUCOSE BLD-MCNC: 175 MG/DL (ref 70–99)
HBA1C MFR BLD: 8.4 % (ref 4.8–5.9)
HDLC SERPL-MCNC: 48 MG/DL
LDL CHOLESTEROL: 95 MG/DL (ref 0–130)
MICROALBUMIN/CREAT 24H UR: 26 MG/L
MICROALBUMIN/CREAT UR-RTO: 10 MCG/MG CREAT
POTASSIUM SERPL-SCNC: 3.8 MMOL/L (ref 3.7–5.3)
SODIUM BLD-SCNC: 136 MMOL/L (ref 135–144)
THYROXINE, FREE: 1.1 NG/DL (ref 0.93–1.7)
TOTAL PROTEIN: 7.3 G/DL (ref 6.4–8.3)
TRIGL SERPL-MCNC: 118 MG/DL
TSH SERPL DL<=0.05 MIU/L-ACNC: 4.3 MIU/L (ref 0.3–5)
VLDLC SERPL CALC-MCNC: NORMAL MG/DL (ref 1–30)

## 2019-04-06 PROCEDURE — 80061 LIPID PANEL: CPT

## 2019-04-06 PROCEDURE — 82570 ASSAY OF URINE CREATININE: CPT

## 2019-04-06 PROCEDURE — 80053 COMPREHEN METABOLIC PANEL: CPT

## 2019-04-06 PROCEDURE — 84443 ASSAY THYROID STIM HORMONE: CPT

## 2019-04-06 PROCEDURE — 36415 COLL VENOUS BLD VENIPUNCTURE: CPT

## 2019-04-06 PROCEDURE — 82043 UR ALBUMIN QUANTITATIVE: CPT

## 2019-04-06 PROCEDURE — 83036 HEMOGLOBIN GLYCOSYLATED A1C: CPT

## 2019-04-06 PROCEDURE — 84439 ASSAY OF FREE THYROXINE: CPT

## 2019-04-08 ENCOUNTER — TELEPHONE (OUTPATIENT)
Dept: FAMILY MEDICINE CLINIC | Age: 44
End: 2019-04-08

## 2019-04-08 DIAGNOSIS — Z79.4 TYPE 2 DIABETES MELLITUS WITH CHRONIC KIDNEY DISEASE, WITH LONG-TERM CURRENT USE OF INSULIN, UNSPECIFIED CKD STAGE (HCC): Primary | ICD-10-CM

## 2019-04-08 DIAGNOSIS — E11.22 TYPE 2 DIABETES MELLITUS WITH CHRONIC KIDNEY DISEASE, WITH LONG-TERM CURRENT USE OF INSULIN, UNSPECIFIED CKD STAGE (HCC): Primary | ICD-10-CM

## 2019-04-08 NOTE — TELEPHONE ENCOUNTER
Took metformin for several days. refuses diabetic educator. Now this is work related sand refuses to take metformin. Will work it out her self. Is taking other medicine.

## 2019-04-08 NOTE — TELEPHONE ENCOUNTER
----- [place] sent at 4/7/2019  7:13 PM EDT -----  Glucose and HA1C remain elevated. Did patient start metformin? She is diabetic. I recommend diabetic education.

## 2019-07-16 ENCOUNTER — TELEPHONE (OUTPATIENT)
Dept: FAMILY MEDICINE CLINIC | Age: 44
End: 2019-07-16

## 2019-08-26 ENCOUNTER — TELEPHONE (OUTPATIENT)
Dept: FAMILY MEDICINE CLINIC | Age: 44
End: 2019-08-26

## 2019-09-24 ENCOUNTER — HOSPITAL ENCOUNTER (OUTPATIENT)
Age: 44
Setting detail: SPECIMEN
Discharge: HOME | End: 2019-09-24
Payer: COMMERCIAL

## 2019-09-24 ENCOUNTER — HOSPITAL ENCOUNTER (OUTPATIENT)
Dept: LAB | Age: 44
Discharge: HOME | End: 2019-09-24
Payer: COMMERCIAL

## 2019-09-24 ENCOUNTER — OFFICE VISIT (OUTPATIENT)
Dept: OBGYN | Age: 44
End: 2019-09-24
Payer: COMMERCIAL

## 2019-09-24 ENCOUNTER — TELEPHONE (OUTPATIENT)
Dept: FAMILY MEDICINE CLINIC | Age: 44
End: 2019-09-24

## 2019-09-24 ENCOUNTER — HOSPITAL ENCOUNTER (OUTPATIENT)
Dept: MAMMOGRAPHY | Age: 44
Discharge: HOME | End: 2019-09-26
Payer: COMMERCIAL

## 2019-09-24 VITALS
HEIGHT: 60 IN | WEIGHT: 180 LBS | SYSTOLIC BLOOD PRESSURE: 120 MMHG | BODY MASS INDEX: 35.34 KG/M2 | DIASTOLIC BLOOD PRESSURE: 68 MMHG | TEMPERATURE: 98.3 F | HEART RATE: 79 BPM

## 2019-09-24 DIAGNOSIS — Z12.4 CERVICAL CANCER SCREENING: ICD-10-CM

## 2019-09-24 DIAGNOSIS — Z01.419 WELL FEMALE EXAM WITH ROUTINE GYNECOLOGICAL EXAM: Primary | ICD-10-CM

## 2019-09-24 DIAGNOSIS — Z13.21 ENCOUNTER FOR VITAMIN DEFICIENCY SCREENING: ICD-10-CM

## 2019-09-24 DIAGNOSIS — Z12.31 OTHER SCREENING MAMMOGRAM: ICD-10-CM

## 2019-09-24 DIAGNOSIS — Z12.31 VISIT FOR SCREENING MAMMOGRAM: ICD-10-CM

## 2019-09-24 LAB — VITAMIN D 25-HYDROXY: 10.8 NG/ML (ref 30–100)

## 2019-09-24 PROCEDURE — G0145 SCR C/V CYTO,THINLAYER,RESCR: HCPCS

## 2019-09-24 PROCEDURE — 36415 COLL VENOUS BLD VENIPUNCTURE: CPT

## 2019-09-24 PROCEDURE — 82306 VITAMIN D 25 HYDROXY: CPT

## 2019-09-24 PROCEDURE — 77063 BREAST TOMOSYNTHESIS BI: CPT

## 2019-09-24 PROCEDURE — 99396 PREV VISIT EST AGE 40-64: CPT | Performed by: NURSE PRACTITIONER

## 2019-09-24 NOTE — PROGRESS NOTES
Past Surgical History:   Procedure Laterality Date    TONSILLECTOMY AND ADENOIDECTOMY      age 4 or 13   [facility] WISDOM TO[facility] EXTRACTION       Family History   Problem Relation Age of Onset    Diabetes Mother         type II    Thyroid Disease Mother         hypothyroidism    Heart Disease Mother         has 3 attacks open heart surgery and also has stents    High Cholesterol Father     Other Father         psoriasis    High Cholesterol Sister     Heart Disease Maternal Grandfather     Stroke Paternal Grandfather     Lung Cancer Paternal Grandmother      Social History     Socioeconomic History    Marital status:      Spouse name: Not on file    Number of children: Not on file    Years of education: Not on file    Highest education level: Not on file   Occupational History    Occupation: stay at home mother     Employer: [employer]   Social Needs    Financial resource strain: Not on file    Food insecurity:     Worry: Not on file     Inability: Not on file    Transportation needs:     Medical: Not on file     Non-medical: Not on file   Tobacco Use    Smoking status: Never Smoker    Smokeless tobacco: Never Used   Substance and Sexual Activity    Alcohol use: No    Drug use: No    Sexual activity: Yes     Partners: Male     Birth control/protection: Condom   Lifestyle    Physical activity:     Days per week: Not on file     Minutes per session: Not on file    Stress: Not on file   Relationships    Social connections:     Talks on phone: Not on file     Gets together: Not on file     Attends [employer] service: Not on file     Active member of club or organization: Not on file     Attends meetings of clubs or organizations: Not on file     Relationship status: Not on file    Intimate partner violence:     Fear of current or ex partner: Not on file     Emotionally abused: Not on file     Physically abused: Not on file     Forced sexual appearance and thin prep PAP obtained  Adnexa: normal bimanual exam and non palpable  Uterus: normal single, nontender    Musculosk:  Normal Gait and station was noted. Digits were evaluated without abnormal findings. Range of motion, stability and strength were evaluated and found to be appropriate for the patients age. ASSESSMENT:      53 y.o. Annual   Diagnosis Orders   1. Well female exam with routine gynecological exam     2. Cervical cancer screening  PAP SMEAR   3. Other screening mammogram  BRODERICK DIGITAL SCREEN W CAD BILATERAL   4. Encounter for vitamin deficiency screening  Vitamin D 25 Hydroxy        Chief Complaint   Patient presents with    Gynecologic Exam     pap         Past Medical History:   Diagnosis Date    [facility] ([facility]     Family history of early CAD     mother had first MI in late 39's    History of gestational diabetes     Hyperlipidemia     Hypertension     Irregular menses     with probable polycystic ovarian syndrome/prediabetes    LFT elevation     likely fatty liver infiltration         Patient Active Problem List   Diagnosis    Hyperlipidemia    Irregular menses    Family history of early CAD    LFT elevation    History of gestational diabetes    Diabetes mellitus ([facility]    Secondary amenorrhea          Hereditary Breast, Ovarian, Colon and Uterine Cancer screening Done. Tobacco & Secondary smoke risks reviewed; instructed on cessation and avoidance    PLAN:  Return in about 1 year (around 9/24/2020) for Annual Exam.  Return for annual exams  Mammograms every 1 year. If 35 yo and last mammogram was negative. Routine health maintenance per patients PCP. Orders Placed This Encounter   Procedures    BRODERICK DIGITAL SCREEN W CAD BILATERAL     Standing Status:   Future     Standing Expiration Date:   3/24/2021     Scheduling Instructions: On the day of the exam, the patient should not wear deodorant, lotion, or powder on the breast or underarm area.

## 2019-09-25 DIAGNOSIS — E55.9 VITAMIN D DEFICIENCY: Primary | ICD-10-CM

## 2019-09-25 RX ORDER — ERGOCALCIFEROL 1.25 MG/1
50000 CAPSULE ORAL WEEKLY
Qty: 14 CAPSULE | Refills: 1 | Status: SHIPPED | OUTPATIENT
Start: 2019-09-25 | End: 2021-01-14

## 2019-10-07 ENCOUNTER — TELEPHONE (OUTPATIENT)
Dept: OBGYN | Age: 44
End: 2019-10-07

## 2019-10-07 LAB — CYTOLOGY REPORT: NORMAL

## 2019-10-08 RX ORDER — IBUPROFEN 600 MG/1
600 TABLET ORAL EVERY 6 HOURS PRN
Qty: 60 TABLET | Refills: 3 | Status: SHIPPED | OUTPATIENT
Start: 2019-10-08 | End: 2021-01-13 | Stop reason: SDUPTHER

## 2020-02-13 ENCOUNTER — TELEPHONE (OUTPATIENT)
Dept: FAMILY MEDICINE CLINIC | Age: 45
End: 2020-02-13

## 2020-02-24 ENCOUNTER — HOSPITAL ENCOUNTER (OUTPATIENT)
Dept: LAB | Age: 45
Discharge: HOME | End: 2020-02-24
Payer: COMMERCIAL

## 2020-02-24 LAB
ALBUMIN SERPL-MCNC: 4.4 G/DL (ref 3.5–5.2)
ALBUMIN/GLOBULIN RATIO: 1.5 (ref 1–2.5)
ALP BLD-CCNC: 70 U/L (ref 35–104)
ALT SERPL-CCNC: 30 U/L (ref 5–33)
ANION GAP SERPL CALCULATED.3IONS-SCNC: 15 MMOL/L (ref 9–17)
AST SERPL-CCNC: 30 U/L
BILIRUB SERPL-MCNC: 0.75 MG/DL (ref 0.3–1.2)
BUN BLDV-MCNC: 9 MG/DL (ref 6–20)
BUN/CREAT BLD: 20 (ref 9–20)
CALCIUM SERPL-MCNC: 8.9 MG/DL (ref 8.6–10.4)
CHLORIDE BLD-SCNC: 100 MMOL/L (ref 98–107)
CHOLESTEROL/HDL RATIO: 4.3
CHOLESTEROL: 204 MG/DL
CO2: 23 MMOL/L (ref 20–31)
CREAT SERPL-MCNC: 0.46 MG/DL (ref 0.5–0.9)
CREATININE URINE: 304.5 MG/DL (ref 28–217)
ESTIMATED AVERAGE GLUCOSE: 192 MG/DL
GFR AFRICAN AMERICAN: >60 ML/MIN
GFR NON-AFRICAN AMERICAN: >60 ML/MIN
GFR SERPL CREATININE-BSD FRML MDRD: ABNORMAL ML/MIN/{1.73_M2}
GFR SERPL CREATININE-BSD FRML MDRD: ABNORMAL ML/MIN/{1.73_M2}
GLUCOSE BLD-MCNC: 173 MG/DL (ref 70–99)
HBA1C MFR BLD: 8.3 % (ref 4.8–5.9)
HDLC SERPL-MCNC: 48 MG/DL
LDL CHOLESTEROL: 134 MG/DL (ref 0–130)
MICROALBUMIN/CREAT 24H UR: 21 MG/L
MICROALBUMIN/CREAT UR-RTO: 7 MCG/MG CREAT
POTASSIUM SERPL-SCNC: 3.9 MMOL/L (ref 3.7–5.3)
SODIUM BLD-SCNC: 138 MMOL/L (ref 135–144)
THYROXINE, FREE: 1.03 NG/DL (ref 0.93–1.7)
TOTAL PROTEIN: 7.4 G/DL (ref 6.4–8.3)
TRIGL SERPL-MCNC: 109 MG/DL
TSH SERPL DL<=0.05 MIU/L-ACNC: 3.82 MIU/L (ref 0.3–5)
VLDLC SERPL CALC-MCNC: ABNORMAL MG/DL (ref 1–30)

## 2020-02-24 PROCEDURE — 84443 ASSAY THYROID STIM HORMONE: CPT

## 2020-02-24 PROCEDURE — 83036 HEMOGLOBIN GLYCOSYLATED A1C: CPT

## 2020-02-24 PROCEDURE — 84439 ASSAY OF FREE THYROXINE: CPT

## 2020-02-24 PROCEDURE — 80061 LIPID PANEL: CPT

## 2020-02-24 PROCEDURE — 82570 ASSAY OF URINE CREATININE: CPT

## 2020-02-24 PROCEDURE — 36415 COLL VENOUS BLD VENIPUNCTURE: CPT

## 2020-02-24 PROCEDURE — 80053 COMPREHEN METABOLIC PANEL: CPT

## 2020-02-24 PROCEDURE — 82043 UR ALBUMIN QUANTITATIVE: CPT

## 2020-02-25 ENCOUNTER — TELEPHONE (OUTPATIENT)
Dept: FAMILY MEDICINE CLINIC | Age: 45
End: 2020-02-25

## 2020-02-26 ENCOUNTER — OFFICE VISIT (OUTPATIENT)
Dept: FAMILY MEDICINE CLINIC | Age: 45
End: 2020-02-26
Payer: COMMERCIAL

## 2020-02-26 VITALS
DIASTOLIC BLOOD PRESSURE: 70 MMHG | SYSTOLIC BLOOD PRESSURE: 112 MMHG | WEIGHT: 178 LBS | HEIGHT: 60 IN | BODY MASS INDEX: 34.95 KG/M2 | HEART RATE: 72 BPM

## 2020-02-26 PROCEDURE — 3052F HG A1C>EQUAL 8.0%<EQUAL 9.0%: CPT | Performed by: PHYSICIAN ASSISTANT

## 2020-02-26 PROCEDURE — 99214 OFFICE O/P EST MOD 30 MIN: CPT | Performed by: PHYSICIAN ASSISTANT

## 2020-02-26 ASSESSMENT — PATIENT HEALTH QUESTIONNAIRE - PHQ9
1. LITTLE INTEREST OR PLEASURE IN DOING THINGS: 0
2. FEELING DOWN, DEPRESSED OR HOPELESS: 0
SUM OF ALL RESPONSES TO PHQ9 QUESTIONS 1 & 2: 0
SUM OF ALL RESPONSES TO PHQ QUESTIONS 1-9: 0
SUM OF ALL RESPONSES TO PHQ QUESTIONS 1-9: 0

## 2020-02-26 ASSESSMENT — ENCOUNTER SYMPTOMS: RESPIRATORY NEGATIVE: 1

## 2020-02-26 NOTE — PATIENT INSTRUCTIONS
good, quick way to make sure that you have a balanced meal. It also helps you spread carbs throughout the day. ? Divide your plate by types of foods. Put non-starchy vegetables on half the plate, meat or other protein food on one-quarter of the plate, and a grain or starchy vegetable in the final quarter of the plate. To this you can add a small piece of fruit and 1 cup of milk or yogurt, depending on how many carbs you are supposed to eat at a meal.  · Try to eat about the same amount of carbs at each meal. Do not \"save up\" your daily allowance of carbs to eat at one meal.  · Proteins have very little or no carbs per serving. Examples of proteins are beef, chicken, turkey, fish, eggs, tofu, cheese, cottage cheese, and peanut butter. A serving size of meat is 3 ounces, which is about the size of a deck of cards. Examples of meat substitute serving sizes (equal to 1 ounce of meat) are 1/4 cup of cottage cheese, 1 egg, 1 tablespoon of peanut butter, and ½ cup of tofu. How can you eat out and still eat healthy? · Learn to estimate the serving sizes of foods that have carbohydrate. If you measure food at home, it will be easier to estimate the amount in a serving of restaurant food. · If the meal you order has too much carbohydrate (such as potatoes, corn, or baked beans), ask to have a low-carbohydrate food instead. Ask for a salad or green vegetables. · If you use insulin, check your blood sugar before and after eating out to help you plan how much to eat in the future. · If you eat more carbohydrate at a meal than you had planned, take a walk or do other exercise. This will help lower your blood sugar. What else should you know? · Limit saturated fat, such as the fat from meat and dairy products. This is a healthy choice because people who have diabetes are at higher risk of heart disease. So choose lean cuts of meat and nonfat or low-fat dairy products.  Use olive or canola oil instead of butter or shortening reach a healthy weight if that is one of your goals. What plan is right for you? Your dietitian or diabetes educator may suggest that you start with the plate format or carbohydrate counting. The plate format  The plate format is a simple way to help you manage how you eat. You plan meals by learning how much space each food should take on a plate. Using the plate format helps you spread carbohydrate throughout the day. It can make it easier to keep your blood sugar level within your target range. It also helps you see if you're eating healthy portion sizes. To use the plate format, you put non-starchy vegetables on half your plate. Add meat or meat substitutes on one-quarter of the plate. Put a grain or starchy vegetable (such as brown rice or a potato) on the final quarter of the plate. You can add a small piece of fruit and some low-fat or fat-free milk or yogurt, depending on your carbohydrate goal for each meal.  Here are some tips for using the plate format:  · Make sure that you are not using an oversized plate. A 9-inch plate is best. Many restaurants use larger plates. · Get used to using the plate format at home. Then you can use it when you eat out. · Write down your questions about using the plate format. Talk to your doctor, a dietitian, or a diabetes educator about your concerns. Carbohydrate counting  With carbohydrate counting, you plan meals based on the amount of carbohydrate in each food. Carbohydrate raises blood sugar higher and more quickly than any other nutrient. It is found in desserts, breads and cereals, and fruit. It's also found in starchy vegetables such as potatoes and corn, grains such as rice and pasta, and milk and yogurt. Spreading carbohydrate throughout the day helps keep your blood sugar levels within your target range.   Your daily amount depends on several things, including your weight, how active you are, which diabetes medicines you take, and what your goals are for

## 2020-02-26 NOTE — PROGRESS NOTES
Subjective:      Patient ID: Abbe Marin is a 53 y.o. female. Diabetes   She presents for her follow-up diabetic visit. She has type 2 diabetes mellitus. Pertinent negatives for diabetes include no polydipsia, no polyphagia and no polyuria. Current diabetic treatment includes diet. Her weight is stable. She is following a generally healthy (Patient recently started Mediterranean diet) diet. Meal planning includes carbohydrate counting. She participates in exercise daily (Walking 15 minutes). An ACE inhibitor/angiotensin II receptor blocker is not being taken. Hyperlipidemia   This is a chronic problem. The current episode started more than 1 year ago. Exacerbating diseases include obesity. Current antihyperlipidemic treatment includes diet change. Review of Systems   Constitutional: Negative. HENT: Negative. Respiratory: Negative. Cardiovascular: Negative. Endocrine: Negative for polydipsia, polyphagia and polyuria. Past Medical History:   Diagnosis Date    Diabetes mellitus ([address]     Family history of early CAD     mother had first MI in late 39's    History of gestational diabetes     Hyperlipidemia     Hypertension     Irregular menses     with probable polycystic ovarian syndrome/prediabetes    LFT elevation     likely fatty liver infiltration     Past Surgical History:   Procedure Laterality Date    TONSILLECTOMY AND ADENOIDECTOMY      age 4 or 13   [facility] WISDOM TOOTH EXTRACTION       Social History     Tobacco Use    Smoking status: Never Smoker    Smokeless tobacco: Never Used   Substance Use Topics    Alcohol use: No    Drug use: No       Objective:   Physical Exam  HENT:      Head: Normocephalic. Right Ear: Tympanic membrane normal.      Left Ear: Tympanic membrane normal.      Mouth/Throat:      Mouth: Mucous membranes are moist.   Eyes:      Pupils: Pupils are equal, round, and reactive to light. Neck:      Musculoskeletal: Neck supple.    Cardiovascular: Bun/Cre Ratio 02/24/2020 20  9 - 20 Final    Calcium 02/24/2020 8.9  8.6 - 10.4 mg/dL Final    Sodium 02/24/2020 138  135 - 144 mmol/L Final    Potassium 02/24/2020 3.9  3.7 - 5.3 mmol/L Final    Chloride 02/24/2020 100  98 - 107 mmol/L Final    CO2 02/24/2020 23  20 - 31 mmol/L Final    Anion Gap 02/24/2020 15  9 - 17 mmol/L Final    Alkaline Phosphatase 02/24/2020 70  35 - 104 U/L Final    ALT 02/24/2020 30  5 - 33 U/L Final    AST 02/24/2020 30  <32 U/L Final    Total Bilirubin 02/24/2020 0.75  0.3 - 1.2 mg/dL Final    Total Protein 02/24/2020 7.4  6.4 - 8.3 g/dL Final    Alb 02/24/2020 4.4  3.5 - 5.2 g/dL Final    Albumin/Globulin Ratio 02/24/2020 1.5  1.0 - 2.5 Final    GFR Non- 02/24/2020 >60  >60 mL/min Final    GFR  02/24/2020 >60  >60 mL/min Final    GFR Comment 02/24/2020        Final    Comment: Average GFR for 38-51 years old:   80 mL/min/1.73sq m  Chronic Kidney Disease:   <60 mL/min/1.73sq m  Kidney failure:   <15 mL/min/1.73sq m              eGFR calculated using average adult body mass. Additional eGFR calculator available at:        [email or web address]            GFR Staging 02/24/2020 NOT REPORTED   Final    Microalb, Ur 02/24/2020 21* <21 mg/L Final    Creatinine, Ur 02/24/2020 304.5* 28.0 - 217.0 mg/dL Final    Microalb/Crt. Ratio 02/24/2020 7  <25 mcg/mg creat Final       Assessment:      1. Type 2 diabetes mellitus with hyperglycemia, without long-term current use of insulin ([facility] [address]    2. Hyperlipidemia, unspecified hyperlipidemia type    3. Need for diphtheria-tetanus-pertussis (Tdap) vaccine    4. Need for 23-polyvalent pneumococcal polysaccharide vaccine          Plan:      Laboratory studies reviewed with patient. Patient chooses to try Mediterranean diet before medications. Importance of healthy diet and routine exercise. Weight reduction. Repeat labs in three months. Discussed diabetic education.   Tdap and

## 2020-02-26 NOTE — PROGRESS NOTES
Lurdes received counseling on the following healthy behaviors: exercise  Reviewed prior labs and health maintenance  Continue current medications, diet and exercise. Discussed use, benefit, and side effects of prescribed medications. Barriers to medication compliance addressed. Patient given educational materials - see patient instructions  Was a self-tracking handout given in paper form or via [employer]? No:      Requested Prescriptions     Pending Prescriptions Disp Refills    Tdap (ADACEL) 5-2-15.5 LF-MCG/0.5 injection 0.5 mL 0     Sig: Inject 0.5 mLs into the muscle once for 1 dose    pneumococcal polyvalent (PNEUMOVAX 23) 25 MCG/0.5ML inj 0.5 mL 0     Sig: Inject 0.5 mLs into the muscle once for 1 dose       All patient questions answered. Patient voiced understanding. Quality Measures    Body mass index is 34.76 kg/m². Elevated. Weight control planned discussed conventional weight loss and Healthy diet and regular exercise. Blood pressure is normal. Treatment plan consists of No treatment change needed.     Lab Results   Component Value Date    LDLCHOLESTEROL 134 (H) 02/24/2020    (goal LDL reduction with dx if diabetes is 50% LDL reduction)      PHQ Scores 2/26/2020 1/8/2019 1/8/2019 12/14/2017 11/2/2016   PHQ2 Score 0 0 - 0 0   PHQ9 Score 0 0 0 0 0     Interpretation of Total Score Depression Severity: 1-4 = Minimal depression, 5-9 = Mild depression, 10-14 = Moderate depression, 15-19 = Moderately severe depression, 20-27 = Severe depression

## 2020-02-27 ENCOUNTER — TELEPHONE (OUTPATIENT)
Dept: FAMILY MEDICINE CLINIC | Age: 45
End: 2020-02-27

## 2020-06-30 ENCOUNTER — TELEPHONE (OUTPATIENT)
Dept: FAMILY MEDICINE CLINIC | Age: 45
End: 2020-06-30

## 2020-08-19 ENCOUNTER — TELEPHONE (OUTPATIENT)
Dept: FAMILY MEDICINE CLINIC | Age: 45
End: 2020-08-19

## 2020-08-19 NOTE — TELEPHONE ENCOUNTER
Pt calling questioning what labs are due prior to appt, pt informed of lipids, cmp and A1c and pt questions why she needs all these labs for just a diabetes follow up, informed pt 2 of these labs have to do with her diabetes and pt states \"that is not going to work\", pt wanted to just cancel her appt and I told her nurse would call and discuss labs further, as pt states she just can't afford to have all these labs done, please advise at above number.

## 2020-09-29 ENCOUNTER — HOSPITAL ENCOUNTER (OUTPATIENT)
Dept: MAMMOGRAPHY | Age: 45
Discharge: HOME | End: 2020-10-01
Payer: COMMERCIAL

## 2020-09-29 PROCEDURE — 77063 BREAST TOMOSYNTHESIS BI: CPT

## 2020-10-12 ENCOUNTER — TELEPHONE (OUTPATIENT)
Dept: FAMILY MEDICINE CLINIC | Age: 45
End: 2020-10-12

## 2021-01-13 ENCOUNTER — OFFICE VISIT (OUTPATIENT)
Dept: OBGYN | Age: 46
End: 2021-01-13
Payer: COMMERCIAL

## 2021-01-13 ENCOUNTER — HOSPITAL ENCOUNTER (OUTPATIENT)
Dept: LAB | Age: 46
Discharge: HOME | End: 2021-01-13
Payer: COMMERCIAL

## 2021-01-13 VITALS
WEIGHT: 175 LBS | SYSTOLIC BLOOD PRESSURE: 122 MMHG | TEMPERATURE: 98.4 F | HEART RATE: 80 BPM | DIASTOLIC BLOOD PRESSURE: 84 MMHG | BODY MASS INDEX: 34.36 KG/M2 | HEIGHT: 60 IN

## 2021-01-13 DIAGNOSIS — Z12.4 SCREENING FOR CERVICAL CANCER: ICD-10-CM

## 2021-01-13 DIAGNOSIS — Z01.419 WELL FEMALE EXAM WITH ROUTINE GYNECOLOGICAL EXAM: Primary | ICD-10-CM

## 2021-01-13 DIAGNOSIS — Z01.419 WELL FEMALE EXAM WITH ROUTINE GYNECOLOGICAL EXAM: ICD-10-CM

## 2021-01-13 DIAGNOSIS — E55.9 VITAMIN D DEFICIENCY: ICD-10-CM

## 2021-01-13 DIAGNOSIS — Z12.31 OTHER SCREENING MAMMOGRAM: ICD-10-CM

## 2021-01-13 LAB
ABSOLUTE EOS #: 0.14 K/UL (ref 0–0.44)
ABSOLUTE IMMATURE GRANULOCYTE: <0.03 K/UL (ref 0–0.3)
ABSOLUTE LYMPH #: 2.43 K/UL (ref 1.1–3.7)
ABSOLUTE MONO #: 0.48 K/UL (ref 0.1–1.2)
BASOPHILS # BLD: 1 % (ref 0–2)
BASOPHILS ABSOLUTE: 0.09 K/UL (ref 0–0.2)
DIFFERENTIAL TYPE: NORMAL
EOSINOPHILS RELATIVE PERCENT: 2 % (ref 1–4)
HCT VFR BLD CALC: 41.6 % (ref 36.3–47.1)
HEMOGLOBIN: 13.3 G/DL (ref 11.9–15.1)
IMMATURE GRANULOCYTES: 0 %
LYMPHOCYTES # BLD: 37 % (ref 24–43)
MCH RBC QN AUTO: 28.7 PG (ref 25.2–33.5)
MCHC RBC AUTO-ENTMCNC: 32 G/DL (ref 25.2–33.5)
MCV RBC AUTO: 89.7 FL (ref 82.6–102.9)
MONOCYTES # BLD: 7 % (ref 3–12)
NRBC AUTOMATED: 0 PER 100 WBC
PDW BLD-RTO: 13.2 % (ref 11.8–14.4)
PLATELET # BLD: 282 K/UL (ref 138–453)
PLATELET ESTIMATE: NORMAL
PMV BLD AUTO: 10.4 FL (ref 8.1–13.5)
RBC # BLD: 4.64 M/UL (ref 3.95–5.11)
RBC # BLD: NORMAL 10*6/UL
SEG NEUTROPHILS: 53 % (ref 36–65)
SEGMENTED NEUTROPHILS ABSOLUTE COUNT: 3.34 K/UL (ref 1.5–8.1)
VITAMIN D 25-HYDROXY: 9.4 NG/ML (ref 30–100)
WBC # BLD: 6.5 K/UL (ref 3.5–11.3)
WBC # BLD: NORMAL 10*3/UL

## 2021-01-13 PROCEDURE — 85025 COMPLETE CBC W/AUTO DIFF WBC: CPT

## 2021-01-13 PROCEDURE — G8484 FLU IMMUNIZE NO ADMIN: HCPCS | Performed by: NURSE PRACTITIONER

## 2021-01-13 PROCEDURE — 82306 VITAMIN D 25 HYDROXY: CPT

## 2021-01-13 PROCEDURE — G0145 SCR C/V CYTO,THINLAYER,RESCR: HCPCS

## 2021-01-13 PROCEDURE — 99396 PREV VISIT EST AGE 40-64: CPT | Performed by: NURSE PRACTITIONER

## 2021-01-13 PROCEDURE — 36415 COLL VENOUS BLD VENIPUNCTURE: CPT

## 2021-01-13 RX ORDER — IBUPROFEN 600 MG/1
600 TABLET ORAL EVERY 6 HOURS PRN
Qty: 60 TABLET | Refills: 3 | Status: SHIPPED | OUTPATIENT
Start: 2021-01-13

## 2021-01-13 ASSESSMENT — PATIENT HEALTH QUESTIONNAIRE - PHQ9
SUM OF ALL RESPONSES TO PHQ QUESTIONS 1-9: 0

## 2021-01-13 NOTE — PROGRESS NOTES
Mele Oconnor  2023              50 y.o. Chief Complaint   Patient presents with    Gynecologic Exam         Patient's last menstrual period was 2021. Usman 30, APRN - CNP  [address],  [place] [facility] [place]iz    HPI :Annual Exam  Patient presents for annual exam.  Counseling on healthy lifestyle reviewed, as well as the need for self breast exam. We reviewed the need for Kegal exercises and literature was provided. We discussed and reviewed the need for routine screenings and immunization updates when appropriate. Menstrual cycles continue to be irregular. Menses occur every 4-8 weeks. Flow lasts 4-7 days. Two days of heavy bleeding with clotting. Flow is reduced with ibuprofen. Not restricted by menses. Does have some fatique    Vitamin D deficiency. Was on weekly prescription last year. Was not aware she had refills and did not have follow up labs. The patient is sexually active.     last pap: was normal, last mammogram: was normal  The patient has regular exercise: no . We did review the need for and frequency of both weight bearing and strengthening as tolerated by the patient. ________________________________________________________________________  OB History    Para Term  AB Living   1 1 1 0 0 1   SAB TAB Ectopic Molar Multiple Live Births   0 0 0 0 0 0      # Outcome Date GA Lbr Arash/2nd Weight Sex Delivery Anes PTL Lv   1 Term               Obstetric Comments   Menarche-10   Cycles-historically irregular/didn't tolerate oral contraceptives     Past Medical History:   Diagnosis Date    Diabetes mellitus ([facility] [address]     Family history of early CAD     mother had first MI in late 42's    History of gestational diabetes     Hyperlipidemia     Hypertension     Irregular menses     with probable polycystic ovarian syndrome/prediabetes    LFT elevation     likely fatty liver infiltration Past Surgical History:   Procedure Laterality Date    TONSILLECTOMY AND ADENOIDECTOMY      age 4 or 13   [facility] WISDOM TOOTH EXTRACTION       Family History   Problem Relation Age of Onset    Diabetes Mother         type II    Thyroid Disease Mother         hypothyroidism    Heart Disease Mother         has 3 attacks open heart surgery and also has stents    High Cholesterol Father     Other Father         psoriasis    High Cholesterol Sister     Heart Disease Maternal Grandfather     Stroke Paternal Grandfather     Lung Cancer Paternal Grandmother      Social History     Socioeconomic History    Marital status:      Spouse name: Not on file    Number of children: Not on file    Years of education: Not on file    Highest education level: Not on file   Occupational History    Occupation: stay at home mother     Employer: [employer]   Social Needs    Financial resource strain: Not on file    Food insecurity     Worry: Not on file     Inability: Not on file    Transportation needs     Medical: Not on file     Non-medical: Not on file   Tobacco Use    Smoking status: Never Smoker    Smokeless tobacco: Never Used   Substance and Sexual Activity    Alcohol use: No    Drug use: No    Sexual activity: Yes     Partners: Male     Birth control/protection: Condom   Lifestyle    Physical activity     Days per week: Not on file     Minutes per session: Not on file    Stress: Not on file   Relationships    Social connections     Talks on phone: Not on file     Gets together: Not on file     Attends Voodoo service: Not on file     Active member of club or organization: Not on file     Attends meetings of clubs or organizations: Not on file     Relationship status: Not on file    Intimate partner violence     Fear of current or ex partner: Not on file     Emotionally abused: Not on file Physically abused: Not on file     Forced sexual activity: Not on file   Other Topics Concern    Not on file   Social History Narrative    Not on file       MEDICATIONS:  Current Outpatient Medications   Medication Sig Dispense Refill    ibuprofen (ADVIL;MOTRIN) 600 MG tablet Take 1 tablet by mouth every 6 hours as needed for Pain 60 tablet 3    aspirin 81 MG tablet Take 81 mg by mouth daily.  vitamin D (ERGOCALCIFEROL) 09204 units CAPS capsule Take 1 capsule by mouth once a week (Patient not taking: Reported on 2/26/2020) 14 capsule 1    metFORMIN (GLUCOPHAGE-XR) 500 MG extended release tablet Take 1 tablet by mouth 2 times daily (Patient not taking: Reported on 2/26/2020) 60 tablet 3    levothyroxine (SYNTHROID) 25 MCG tablet Take 1 tablet by mouth daily (Patient not taking: Reported on 2/26/2020) 30 tablet 3    pitavastatin (LIVALO) 1 MG TABS tablet Take 1 tablet by mouth nightly (Patient not taking: Reported on 2/26/2020) 30 tablet 3    losartan (COZAAR) 25 MG tablet Take 1 tablet by mouth daily (Patient not taking: Reported on 2/26/2020) 30 tablet 3     No current facility-administered medications for this visit. ALLERGIES:  Allergies as of 01/13/2021    (No Known Allergies)     Gynecologic History:  Menarche: 10   Menopause at n/a      Patient's last menstrual period was 01/06/2021.   Hormone Exposure: No    Family History of Breast, Ovarian , Colon or Uterine Cancer: No     Preventative Health Testing:  Date of Last Pap Smear: 2019  Date of Last Mammogram: 2020    ________________________________________________________________________      Review Of Systems:  General ROS:  negative  Hematological and Lymphatic ROS:negative   Breast ROS: negative  Cardiovascular ROS: negative  Respiratory ROS: negative   Gastrointestinal ROS: negative  Genito-Urinary ROS: negative  Psychological ROS: negative  Neurological ROS: negative  Musculoskeletal ROS: negative Dermatological ROS: negative                                                                                                                                                                                   PHYSICAL Exam:     Constitutional:  Blood pressure 122/84, pulse 80, temperature 98.4 °F (36.9 °C), temperature source Temporal, height 5' (1.524 m), weight 175 lb (79.4 kg), last menstrual period 01/06/2021, not currently breastfeeding. General Appearance: This  is a well Developed, well Nourished, well groomed female. Her BMI was reviewed. Skin:  There was a Normal Inspection of the skin without rashes or lesions. Lymphatic:  No Lymph Nodes were Palpable in the neck , axilla or groin. Neck and EENT:  The neck was supple. There were no masses   The thyroid was not enlarged and had no masses. PERRLA, Nares Patent No Masses    Respiratory: The lungs were auscultated and found to be clear. There were no rales, rhonchi or wheezes. There was a good respiratory effort. Cardiovascular: The heart was in a regular rate and rhythm. There was no murmur appreciated. Extremities: The patients extremities were without calf tenderness, edema, or varicosities. There was full range of motion in all four extremities. Pulses in all four extremities    Abdomen: The abdomen was soft and non-tender. There was no guarding, rebound or rigidity. On evaluation there was no evidence of hepatosplenomegaly and there was no costal vertebral china tenderness bilaterally. No hernias were appreciated. Psych:   The patient had a normal Orientation to: Time, Place, Person, and Situation  Mood and affect appropriate    Breast:  (Chest)  normal appearance, no masses or tenderness, Inspection negative, No nipple retraction or dimpling, No nipple discharge or bleeding, No axillary or supraclavicular adenopathy, Normal to palpation without dominant masses      Pelvic Exam: External genitalia: normal general appearance  Urinary system: urethral meatus normal  Vaginal: normal mucosa without prolapse or lesions, normal without tenderness, induration or masses and normal rugae  Cervix: normal appearance and thin prep PAP obtained  Adnexa: normal bimanual exam and non palpable  Uterus: normal single, nontender    Musculosk:  Normal Gait and station was noted. Digits were evaluated without abnormal findings. Range of motion, stability and strength were evaluated and found to be appropriate for the patients age. ASSESSMENT:      50 y.o. Annual   Diagnosis Orders   1. Well female exam with routine gynecological exam  CBC Auto Differential   2. Other screening mammogram  BRODERICK DIANE DIGITAL SCREEN BILATERAL   3. Screening for cervical cancer  PAP SMEAR   4. Vitamin D deficiency  Vitamin D 25 Hydroxy        Chief Complaint   Patient presents with    Gynecologic Exam         Past Medical History:   Diagnosis Date    Diabetes mellitus ([facility] [address]     Family history of early CAD     mother had first MI in late 39's    History of gestational diabetes     Hyperlipidemia     Hypertension     Irregular menses     with probable polycystic ovarian syndrome/prediabetes    LFT elevation     likely fatty liver infiltration         Patient Active Problem List   Diagnosis    Hyperlipidemia    Irregular menses    Family history of early CAD    LFT elevation    History of gestational diabetes    Diabetes mellitus ([facility] [address]    Secondary amenorrhea    Vitamin D deficiency          Hereditary Breast, Ovarian, Colon and Uterine Cancer screening Done. Tobacco & Secondary smoke risks reviewed; instructed on cessation and avoidance    PLAN:  Return in about 1 year (around 1/13/2022) for Annual Exam.  Return for annual exams  Mammograms every 1 year. If 35 yo and last mammogram was negative. Routine health maintenance per patients PCP.   Orders Placed This Encounter   Procedures  BRODERICK DIANE DIGITAL SCREEN BILATERAL     Standing Status:   Future     Standing Expiration Date:   7/13/2022    PAP SMEAR     Standing Status:   Future     Number of Occurrences:   1     Standing Expiration Date:   3/13/2021     Order Specific Question:   Collection Type     Answer: Thin Prep     Order Specific Question:   Prior Abnormal Pap Test     Answer:   No     Order Specific Question:   Screening or Diagnostic     Answer:   Screening     Order Specific Question:   HPV Requested?      Answer:   Yes - If Abnormal Reflex HPV     Order Specific Question:   High Risk Patient     Answer:   N/A    Vitamin D 25 Hydroxy     Standing Status:   Future     Number of Occurrences:   1     Standing Expiration Date:   4/13/2021    CBC Auto Differential     Standing Status:   Future     Number of Occurrences:   1     Standing Expiration Date:   5/13/2021       Connie Caputo  1/13/2021

## 2021-01-14 DIAGNOSIS — E55.9 VITAMIN D DEFICIENCY: Primary | ICD-10-CM

## 2021-01-14 RX ORDER — ERGOCALCIFEROL 1.25 MG/1
50000 CAPSULE ORAL WEEKLY
Qty: 12 CAPSULE | Refills: 2 | Status: SHIPPED | OUTPATIENT
Start: 2021-01-14

## 2021-01-21 LAB — CYTOLOGY REPORT: NORMAL

## 2021-02-01 ENCOUNTER — TELEPHONE (OUTPATIENT)
Dept: FAMILY MEDICINE CLINIC | Age: 46
End: 2021-02-01

## 2021-03-08 ENCOUNTER — TELEPHONE (OUTPATIENT)
Dept: FAMILY MEDICINE CLINIC | Age: 46
End: 2021-03-08

## 2021-04-26 ENCOUNTER — TELEPHONE (OUTPATIENT)
Dept: FAMILY MEDICINE CLINIC | Age: 46
End: 2021-04-26

## 2021-04-26 NOTE — TELEPHONE ENCOUNTER
Patient was told that [facility] did not have any appointments available until June and was patient was wanting to see if she could get lab work and an appointment scheduled with [facility] because she really does not want to see another provider.   Please call patient

## 2021-04-26 NOTE — TELEPHONE ENCOUNTER
What do you want ordered. Do we do blood work ahead of time. Do you want me to use a medicare or hospital follow up. We truly have no openings.

## 2021-06-22 ENCOUNTER — TELEPHONE (OUTPATIENT)
Dept: OBGYN | Age: 46
End: 2021-06-22

## 2021-06-22 NOTE — TELEPHONE ENCOUNTER
Spoke with patient and reminded of overdue labs, order extended. Patient does not wish to do, order cancelled.

## 2021-10-15 LAB — HBA1C MFR BLD: 8.5 %

## 2022-03-07 ENCOUNTER — TELEPHONE (OUTPATIENT)
Dept: OBGYN | Age: 47
End: 2022-03-07

## 2023-09-05 ENCOUNTER — TELEPHONE (OUTPATIENT)
Dept: BEHAVIORAL/MENTAL HEALTH | Age: 48
End: 2023-09-05

## 2023-09-05 NOTE — TELEPHONE ENCOUNTER
New patient. Wants to see either Sonia Francis or Heather Casas. Patient was referred by a friend. Patient wants seen for past trauma.   Please return call with appointment

## 2023-09-12 ENCOUNTER — TELEPHONE (OUTPATIENT)
Dept: BEHAVIORAL/MENTAL HEALTH | Age: 48
End: 2023-09-12